# Patient Record
Sex: FEMALE | Race: WHITE
[De-identification: names, ages, dates, MRNs, and addresses within clinical notes are randomized per-mention and may not be internally consistent; named-entity substitution may affect disease eponyms.]

---

## 2018-02-15 ENCOUNTER — HOSPITAL ENCOUNTER (INPATIENT)
Dept: HOSPITAL 68 - SDA | Age: 83
LOS: 5 days | Discharge: NURSING, REHAB OR LONG-TERM CARE | DRG: 470 | End: 2018-02-20
Attending: ORTHOPAEDIC SURGERY | Admitting: ORTHOPAEDIC SURGERY
Payer: COMMERCIAL

## 2018-02-15 VITALS — SYSTOLIC BLOOD PRESSURE: 140 MMHG | DIASTOLIC BLOOD PRESSURE: 70 MMHG

## 2018-02-15 VITALS — HEIGHT: 66 IN | WEIGHT: 200 LBS | BODY MASS INDEX: 32.14 KG/M2

## 2018-02-15 VITALS — SYSTOLIC BLOOD PRESSURE: 140 MMHG | DIASTOLIC BLOOD PRESSURE: 82 MMHG

## 2018-02-15 VITALS — SYSTOLIC BLOOD PRESSURE: 130 MMHG | DIASTOLIC BLOOD PRESSURE: 70 MMHG

## 2018-02-15 VITALS — SYSTOLIC BLOOD PRESSURE: 150 MMHG | DIASTOLIC BLOOD PRESSURE: 80 MMHG

## 2018-02-15 VITALS — DIASTOLIC BLOOD PRESSURE: 80 MMHG | SYSTOLIC BLOOD PRESSURE: 144 MMHG

## 2018-02-15 DIAGNOSIS — Z88.5: ICD-10-CM

## 2018-02-15 DIAGNOSIS — F32.9: ICD-10-CM
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
## 2018-02-15 DIAGNOSIS — F41.9: ICD-10-CM

## 2018-02-15 DIAGNOSIS — F05: ICD-10-CM

## 2018-02-15 DIAGNOSIS — T40.2X5A: ICD-10-CM

## 2018-02-15 DIAGNOSIS — H26.9: ICD-10-CM

## 2018-02-15 DIAGNOSIS — K21.9: ICD-10-CM

## 2018-02-15 DIAGNOSIS — M17.12: Primary | ICD-10-CM

## 2018-02-15 DIAGNOSIS — Z86.73: ICD-10-CM

## 2018-02-15 DIAGNOSIS — E78.5: ICD-10-CM

## 2018-02-15 DIAGNOSIS — Z79.82: ICD-10-CM

## 2018-02-15 DIAGNOSIS — Z96.651: ICD-10-CM

## 2018-02-15 DIAGNOSIS — E03.9: ICD-10-CM

## 2018-02-15 DIAGNOSIS — I10: ICD-10-CM

## 2018-02-15 PROCEDURE — 84300 ASSAY OF URINE SODIUM: CPT

## 2018-02-15 PROCEDURE — 84133 ASSAY OF URINE POTASSIUM: CPT

## 2018-02-15 PROCEDURE — C1713 ANCHOR/SCREW BN/BN,TIS/BN: HCPCS

## 2018-02-15 PROCEDURE — 0SRD0J9 REPLACEMENT OF LEFT KNEE JOINT WITH SYNTHETIC SUBSTITUTE, CEMENTED, OPEN APPROACH: ICD-10-PCS | Performed by: ORTHOPAEDIC SURGERY

## 2018-02-15 PROCEDURE — 3E0T3BZ INTRODUCTION OF ANESTHETIC AGENT INTO PERIPHERAL NERVES AND PLEXI, PERCUTANEOUS APPROACH: ICD-10-PCS | Performed by: ORTHOPAEDIC SURGERY

## 2018-02-15 PROCEDURE — 2NASP: CPT

## 2018-02-15 NOTE — PATIENT DISCHARGE INSTRUCTIONS
Discharge Instructions
 
General Discharge Information
You were seen/treated for:
Left knee pain related to unilateral primary osteoarthritis
You had these procedures:
Left total knee replacement
Watch for these problems:
Increasing pain despite the use of pain medications.
Increasing redness, warmth, and swelling.
Drainage of any type from incision.  
Inability to bear weight on left leg.  
Persistent nausea or vomitting.
Inability to urinate or move bowels.  
Fever greater than 101.5
Call Surgeon to remove: Staples
Do not soak the wound: Yes
No bath, but you may shower: Yes
Other wound care:
Keep wound clean and dry.
 
No ointments or lotions on or near incision at any time. No exceptions.
 
 
 
 
Diet
Continue normal diet: Yes
Recommended Diet: Regular
 
Activity
Full Activity/No Limits: No
Activity Self Limited: Yes
Activity Limited to: Weight bear as tolerated
Other activity limits:
rolling walker assistance
 
Acute Coronary Syndrome
 
Inclusion Criteria
At DC or during hospital stay patient has or had the following:
ACS DIAGNOSIS No
 
Discharge Core Measures
Meds if any: Prescribed or Continued at Discharge
Meds if any: NOT Prescribed or Continued at Discharge
 
Congestive Heart Failure
 
Inclusion Criteria
At DC or during hospital stay patient has or had the following:
CHF DIAGNOSIS No
 
Discharge Core Measures
Meds if any: Prescribed or Continued at Discharge
Meds if any: NOT Prescribed or Continued at Discharge
 
Cerebrovascular accident
 
Inclusion Criteria
At DC or during hospital stay patient has or had the following:
CVA/TIA Diagnosis No
 
Discharge Core Measures
Meds if any: Prescribed or Continued at Discharge
Meds if any: NOT Prescribed or Continued at Discharge
 
Venous thromboembolism
 
Inclusion Criteria
VTE Diagnosis No
VTE Type NONE
VTE Confirmed by (Test) NONE
 
Discharge Core Measures
- Per Current guidelines, there needs to be overlap
- treatment for the first 5 days of Warfarin therapy.
- If discharged on Warfarin prior to 5 days of
- overlap therapy, the patient will need to be
- assessed for post discharge needs including
- *Post discharge parental anticoagulation
- *Warfarin and/or parental anticoagulation education
- *Follow up date to check INR post discharge
At least 5 days overlap therapy as Inpatient No
Meds if any: Prescribed or Continued at Discharge
Note: Overlap Therapy is Warfarin and Anticoagulant
Meds if any: NOT Prescribed or Continued at Discharge

## 2018-02-15 NOTE — PN- ORTHOPEDIC
Core Measures
 
Venous Thromboembolism
VTE Risk Factors Surgery
No Mechanical VTE Prophylaxis d/t N/A MechProphylax Ordered
No VTE Pharm Prophylaxis d/t NA PharmProphylax ordered

## 2018-02-15 NOTE — SURGICAL DISCHARGE SUMMARY
Visit Information
 
Visit Dates
Admission Date:
02/15/18
 
Discharge Date:
2/20/18
 
 
History of Present Illness
Chief Complaint:
Left knee pain related to unilateral primary osteoarthritis
 
Medical History
Neurological: TIA
EENT: cataracts
Cardiovascular: hypertension
Respiratory: NONE
Gastrointestinal: GERD
Hepatic: NONE
Renal: NONE
Musculoskeletal: NONE
Psychiatric: NONE
Endocrine: hypothyroidism
Blood Disorders: NONE
Cancer(s): NONE
GYN/Reproductive: miscarriage
History of MRSA: No
History of VRE: No
History of CDIFF: No
 
Surgical History
Pertinent Surgical History: D&C
 
Psychosocial History
What is Your Primary Language? English
Review of Systems:
See H&P
 
Hospital Course
 
Course
Attending Physician:
Deny Martinez MD
 
Primary Care Physician:
Dorothy HERNANDEZ,[facility] Course:
Mellisa was admitted to the hospital for an elective left total knee 
replacement.  She tolerated the procedure well and was transferred to a general 
surgical floor. Postoperatively she remained confused, until her narcotics were 
discontinued. She also had a witnessed fall while getting up to the bathoom with
a sitte. She reportedly lost her balance while throwing away something in the 
trash and slowly fell to the ground on her buttock. X-rays were obtained, which 
were negative for any fractures or dislocations. By the time of her hospital 
discharge, her diet was advanced and tolerated.  Her vital signs were stable and
within normal limits.  Her pain was controlled with oral pain medications.  She 
voided spontaneously. She remained neurovascularly intact.  She was evaluated 
and treated by physical therapy and was deemed appropriate for discharge to 
short term rehab. Remainder of hospital course was uneventful.
Complications:
None
Allergies:
Coded Allergies:
hydrocodone (unknown 02/02/18)
morphine (hallucinations 02/02/18)
nut - unspecified (UNKNOWN 09/21/16)
 
 
Disposition Summary
 
Disposition
Principal Diagnosis:
Left knee unilateral primary osteoarthritis
Additional Diagnosis:
same as above, s/p
 
left total knee replacement
Discharge Disposition: SNF
 
Discharge Instructions
 
General Discharge Information
Code Status: Full Code
Patient's Diet:
Regular, advance as tolerated
Patient's Activity:
WBAT, rolling walker assistance
Follow-Up Instructions/Appts:
6 week follow up with 
 
will need staples removed around post-op day#14
 
Medications at Discharge
Discharge Medications:
Stop taking the following medications:
Aspirin (Aspirin*) 81 MG TAB.CHEW ORAL DAILY 
 
Docusate Sodium (Docusate Sodium) 100 MG CAPSULE ORAL TWICE DAILY Qty = 30
 
Polyethylene Glycol 3350 (Miralax) 119 GM POWDER ORAL DAILY Qty = 10
 
Continue taking these medications:
Benazepril HCl (Benazepril HCl) 40 MG TABLET
    1 Tablet ORAL DAILY
    Comments:
       LISINOPRIL ADMINISTERED
       Last Taken: 2/20/18
             Time: 9AM
 
Potassium Chloride (K-Tab ER) 20 MEQ TABLET.ER
    1 Tablet ORAL DAILY
    Comments:
       Last Taken: 2/20/18
             Time: 9AM
 
Atenolol (Atenolol) 100 MG TABLET
    1 Tablet ORAL DAILY
    Comments:
       Last Taken: 2/20/18
             Time: 9AM
 
Omeprazole (Omeprazole) 20 MG CAPSULE.DR
    1 Capsule ORAL DAILY
    Comments:
       Last Taken: 2/20/18
             Time: 7AM
 
Amlodipine Besylate (Amlodipine Besylate) 10 MG TABLET
    1 Tablet ORAL DAILY
    Comments:
       Last Taken: 2/20/18
             Time: 9AM
 
Levothyroxine Sodium (Levothyroxine Sodium) 25 MCG TABLET
    1 Tablet ORAL DAILY BEFORE BREAKFAST
    Comments:
       Last Taken: 2/20/18
             Time: 7AM
 
Citalopram Hydrobromide (Citalopram HBr) 40 MG TABLET
    1 Tablet ORAL DAILY
    Comments:
       Last Taken: 2/20/18
             Time: 9AM
 
LORazepam (Ativan) 1 MG TABLET
    1 Tablet ORAL TWICE DAILY
    Comments:
       0.5MG ADMINISTERED
       Last Taken:2/20/18
             Time: 9AM
 
Lovastatin (Lovastatin) 10 MG TABLET
    1 Tablet ORAL DAILY
    Comments:
       Last Taken: 2/20/18
             Time: 5PM
 
Ascorbate Calcium (Vitamin C) 500 MG TABLET
    1 Tablet ORAL DAILY
    Comments:
       DID NOT ADMINISTER
 
Folic Acid (Folic Acid) 0.8 MG TABLET
    1 Tablet ORAL DAILY
    Comments:
       DID NOT ADMINISTER
 
Cholecalciferol (Vitamin D3) (Vitamin D3) 400 UNIT TABLET
    1 Tablet ORAL DAILY
    Comments:
       NOT GIVEN
 
Cyanocobalamin (Vitamin B-12) (Vitamin B-12) 250 MCG TABLET
    1 Tablet ORAL DAILY
    Comments:
       DID NOT ADMINISTER
 
Magnesium Oxide (Magnesium) 250 MG TABLET
    1 Tablet ORAL DAILY
    Comments:
       DID NOT ADMINISTER
 
Start taking the following new medications:
Apixaban (Eliquis) 2.5 MG TABLET
    2.5 Milligram ORAL TWICE DAILY
    Days = 42
    No Refills
    Instructions:
       x6 WEEKS POSTOP
    Comments:
       Last Taken: 2/20/18
             Time: 9AM
 
Celecoxib (Celebrex) 200 MG CAPSULE
    400 Milligram ORAL DAILY
    Days = 10
    No Refills
    Comments:
       Last Taken: 2/20/18
             Time: 9AM
 
Docusate Sodium (Docusate Sodium) 100 MG CAPSULE
    100 Milligram ORAL TWICE DAILY
    Days = 20
    No Refills
    Comments:
       Last Taken: 2/20/18
             Time: 9AM
 
Polyethylene Glycol 3350 (Miralax) 17 GRAM/DOSE POWDER
    17 Gram ORAL DAILY
    Days = 7
    No Refills
    Comments:
       DID NOT ADMINISTER
         
 
Acetaminophen (Tylenol Extra Strength) 500 MG TABLET
    1 Tablet ORAL EVERY 4-6 HOURS AS NEEDED as needed for pain control
    Days = 10
    No Refills
    Comments:
       Last Taken: 2/20/18
             Time: 2AM
 
Tramadol HCl (Ultram) 50 MG TABLET
    1 Tablet ORAL Every 6-8 Hours as Needed as needed for pain control
    Qty = 30
    No Refills
    Comments:
       Last Taken: 2/18/18
             Time: 4PM
 
 
Copies To:
Dorothy HERNANDEZ,Keysha

## 2018-02-15 NOTE — ADMISSION CORE MEASURES
Acute Coronary Syndrome (CM)
 
ACS Core Measures
Acute Coronary Syndrome Diagnosis No
 
Congestive Heart Failure (NEW)
 
CHF Core Measures
Congestive Heart Failure Diagnosis No
 
Cerebrovascular Accident (NEW)
 
CVA Core Measures
CVA/TIA Diagnosis No
 
Venous Thromboembolism [id number]
 
VTE Core Landen (View Protocol)
VTE Risk Factors Surgery
No Mechanical VTE Prophylaxis d/t N/A MechProphylax Ordered
No VTE Pharm Prophylaxis d/t NA PharmProphylax ordered
 
Problem List
 
As ranked by this Provider
includes Assessment & Plan
 1. Unilateral primary osteoarthritis, left knee
 
 
HOME MEDS
Home Med List
Amlodipine Besylate 10 MG TABLET   1 TAB PO DAILY HEART  (Reported)
Ascorbate Calcium (Vitamin C) 500 MG TABLET   1 TAB PO DAILY SUPPLEMENT  (
Reported)
Aspirin (Aspirin*) 81 MG TAB.CHEW   1 TAB PO DAILY HEART HEALTH  (Reported)
Atenolol 100 MG TABLET   1 TAB PO DAILY HEART  (Reported)
Benazepril HCl 40 MG TABLET   1 TAB PO DAILY HEART  (Reported)
Cholecalciferol (Vitamin D3) (Vitamin D3) 400 UNIT TABLET   1 TAB PO DAILY 
SUPPLEMENT  (Reported)
Citalopram Hydrobromide (Citalopram HBr) 40 MG TABLET   1 TAB PO DAILY MENTAL 
HEALTH  (Reported)
Cyanocobalamin (Vitamin B-12) (Vitamin B-12) 250 MCG TABLET   1 TAB PO DAILY 
SUPPLEMENT  (Reported)
Docusate Sodium 100 MG CAPSULE   100 MG PO BID constipation
Folic Acid 0.8 MG TABLET   1 TAB PO DAILY SUPPLEMENT  (Reported)
Levothyroxine Sodium 25 MCG TABLET   1 TAB PO DAILY AC THYROID  (Reported)
LORazepam (Ativan) 1 MG TABLET   1 TAB PO BID ANXIETY  (Reported)
Lovastatin 10 MG TABLET   1 TAB PO DAILY CHOLESTEROL  (Reported)
Magnesium Oxide (Magnesium) 250 MG TABLET   1 TAB PO DAILY SUPPLEMENT  (Reported
)
Omeprazole 20 MG CAPSULE.DR   1 CAP PO DAILY GI  (Reported)
Oxycodone HCl/Acetaminophen (Percocet 5-325 MG Tablet) 1 EACH TABLET   1-2 TAB 
PO Q4P PRN PAIN
Polyethylene Glycol 3350 (Miralax) 119 GM POWDER   17 GM PO DAILY CONSTIPATION
Potassium Chloride (K-Tab ER) 20 MEQ TABLET.ER   1 TAB PO DAILY SUPPLEMENT  (
Reported)

## 2018-02-15 NOTE — PN- ORTHOPEDIC
Subjective
Subjective:
POSTOP CHECK
 
Patient reports postop pain, which is well controlled. Tolerating clears without
any nausea/vomiting. She denies any numbness or tingling.  
 
Objective
Vital Signs and I&Os
Vital Signs
 
 
Date Time Temp Pulse Resp B/P B/P Pulse O2 O2 Flow FiO2
 
     Mean Ox Delivery Rate 
 
02/15 1330 97.5 68 18 130/70  95 Room Air  
 
 
 
Physical Exam:
Gen - Resting comfortably in PACU awake an alert in NAD
Cardiac - S1S2 noted, RRR
Lungs - CTAB
Ext - LLE dressing c/d/i, ice and onQ in place, jpx1 with serosanguineous 
drainage, moves all extremities, soft compartment, sensory and motor intact, alp
in place, no edema or calf tenderness B/L
 - wilcox in place 
Current Medications:
 Current Medications
 
 
  Sig/James Start time  Last
 
Medication Dose Route Stop Time Status Admin
 
Acetaminophen 0 .STK-MED ONE 02/15 0650 DC 
 
  PO   
 
Acetaminophen 650 MG ONCE 02/15 0000 NR 
 
  PO 02/15 2359  
 
Celecoxib 400 MG ONCE 02/15 0000 NR 
 
  PO 02/15 2359  
 
Dexamethasone 0 .STK-MED ONE 02/15 0652 DC 
 
  .ROUTE   
 
Dexamethasone 10 MG ONCE 02/15 0000 NR 
 
  IV 02/15 2359  
 
Gabapentin 0 .STK-MED ONE 02/15 0652 DC 
 
  PO   
 
Gabapentin 300 MG ONCE 02/15 0000 NR 
 
  PO 02/15 2359  
 
Oxycodone HCl 0 .STK-MED ONE 02/15 0652 DC 
 
  PO   
 
Oxycodone HCl 10 MG ONCE 02/15 0000 NR 
 
  PO 02/15 2359  
 
Ropivacaine 500 ML ONCE ONE 02/15 1045 AC 
 
ON-Q Ball 1 BAG INJ 02/17 0424  
 
Scopolamine HBr 0 .STK-MED ONE 02/15 0650 DC 
 
  TOP   
 
Scopolamine HBr 1 PAT ONCE 02/15 0000 NR 
 
  TOP 02/15 2359  
 
Vancomycin HCl 1,500 MG ONCE 02/15 0000 NR 
 
Sodium Chloride 250 ML IV 02/15 2359  
 
 
 
 
Assessment/Plan
Assessment/Plan
82 F POD 0 s/p L TKR, awaiting PT eval
 
Advance diet as tolerated
Cont IVF
PT eval, WBAT
SCAR to bulb suction
Pain meds prn
Cont OnQ pump
Postop abx - vanco
Home meds on board
[facility] tomorrow am
D/c wilcox in am
Encourage IS
 
 
Core Measures
 
Venous Thromboembolism
VTE Risk Factors Surgery
No Mechanical VTE Prophylaxis d/t N/A [facility]
No VTE Pharm Prophylaxis d/t NA PharmProphylax ordered

## 2018-02-15 NOTE — OPERATIVE REPORT
Operative/Inv Procedure Report
Surgery Date: 02/15/18
Name of Procedure:
Left total knee arthroplasty using Angela triathlon system
Pre-Operative Diagnosis:
Severe degenerative joint disease left knee
Post-Operative Diagnosis:
Severe degenerative joint disease left knee severe degenerative joint disease 
left knee
Estimated Blood Loss: 50ml to 100ml
Surgeon/Assistant:
Michelle HERNANDEZ,SATHISH Stoll
 
Anesthesia: block
Implants:
Striker triathlon system #3 femoral posterior stabilized component, #4 tibial 
baseplate, 19 mm polyethylene insert, 31 mm patellar component
Specimens:
Bone specimen sent to the pathology
Tourniquet:
Tourniquet time was 87 minutes
Complications:
None
Condition:
Stable returned to the recovery room
Operative Indication:
This is an 82-year-old lady who has had severe degenerative joint disease of 
both knees.  She had a successful right total knee arthroplasty performed in 
2016.  Despite medication and conservative treatment including injections her 
knee pain has not responded and she is now admitted for left total knee 
arthroplasty
 
Operative/Procedure Note
Note:
The patient received IV vancomycin in the preoperative area.  She was then taken
to the operating room and a spinal anesthetic was administered.
 
A second pair of skilled hands was required for the successful outcome of this 
procedure Mr. Tristan Gill, a certified physician's assistant was necessary for 
retraction, coagulation and positioning of the components.  His presence was 
required for the successful completion of this operation.
 
The patient reached she received a dose of transamic acid, the left lower 
extremity was then prepped and draped in usual sterile fashion.  A legholder was
used throughout the procedure and a tourniquet was inflated above the left thigh
to a pressure of 350 mmHg.  Prior to this the left lower extremity was 
exsanguinated with a sterile Esmarch.
 
A midline incision was made and taken down through subcutaneous cutaneous 
tissue.  Small bleeders are cauterized with the Bovie.  A medial parapatellar 
incision was made and the patella was easily dislocated laterally.  Osteophytes 
were removed from the femur tibia and patella.  A partial medial release was 
performed medial and lateral meniscectomies were performed.  The anterior 
cruciate ligament was excised.  A drill hole was then made just anterior to the 
attachment of the anterior cruciate ligament and the long femoral guide kimberly was 
inserted into the medullary canal.  The distal femoral cutting jig was assembled
and the distal femoral cut was made without difficulty.  The cut surfaces were 
then sized to a #3 femoral template.  Anterior posterior and chamfer cuts were 
then made.  A midline jig was used to osteotomize the midportion of the distal 
femur to accept the posterior stabilized component.
 
Attention was then turned to the tibial surface.  A drill hole was made at the 
footprint of the anterior cruciate ligament and the tibial guide kimberly was placed 
into the tibial medullary canal without difficulty.  3 mm of bone were resected 
from the medial side.  A number for tibial baseplate afforded excellent 
coverage.  A trial reduction was then performed using a 19 mm tibial insert 
patient had a full range of motion with full extension and good medial and 
lateral ligamentous balance.  Posterior osteophytes were then removed from the 
distal femur using a curved osteotome.
 
Attention was then turned to the patella.  A flat osteotomy was then performed 
and a 31 mm patellar trial afforded excellent coverage.  The patella tracked 
well without the need of a lateral release.  The cut surface of the tibial 
plateau was then drilled to accept the fins of the tibial baseplate.
 
The cut surfaces of bone were then copiously irrigated with pulsatile and 
antibiotic solution and dried.  A #3 femoral component was cemented into place. 
Following this a number for tibial baseplate was cemented into place.  Excess 
cement was removed with curettes.  A 19 mm polyethylene temporary trial was then
placed into the tibial baseplate and the knee was held in extension until the 
cement dried.  While this was performed a 31 mm patellar button was cemented 
into place and held with a clamp until the cement had dried.  After 15 minutes 
all cement surfaces were dried.  The tibial trial insert was removed and a 
permanent 19 mm polyethylene insert was placed over the baseplate and locked 
into position.  Patient had a full range of motion, full extension and good 
medial and lateral ligaments balance.  The patella tracked well.  The knee was 
once skin copiously irrigated.  A large Hemovac drain was placed in the lateral 
gutter through a small stab incision.  The deep tissue layers were closed with 0
Vicryl interrupted sutures.  The subcutaneous tissues closed with 2-0 Vicryl and
the skin was closed with surgical staples.  Blood loss was scant tourniquet time
was 87 minutes.  She is returned to the recovery room in excellent condition
 
 
Discharge Disposition: PACU

## 2018-02-16 VITALS — SYSTOLIC BLOOD PRESSURE: 136 MMHG | DIASTOLIC BLOOD PRESSURE: 80 MMHG

## 2018-02-16 VITALS — SYSTOLIC BLOOD PRESSURE: 124 MMHG | DIASTOLIC BLOOD PRESSURE: 58 MMHG

## 2018-02-16 VITALS — SYSTOLIC BLOOD PRESSURE: 122 MMHG | DIASTOLIC BLOOD PRESSURE: 60 MMHG

## 2018-02-16 VITALS — DIASTOLIC BLOOD PRESSURE: 80 MMHG | SYSTOLIC BLOOD PRESSURE: 120 MMHG

## 2018-02-16 VITALS — DIASTOLIC BLOOD PRESSURE: 60 MMHG | SYSTOLIC BLOOD PRESSURE: 132 MMHG

## 2018-02-16 VITALS — DIASTOLIC BLOOD PRESSURE: 76 MMHG | SYSTOLIC BLOOD PRESSURE: 160 MMHG

## 2018-02-16 VITALS — SYSTOLIC BLOOD PRESSURE: 148 MMHG | DIASTOLIC BLOOD PRESSURE: 64 MMHG

## 2018-02-16 LAB
ABSOLUTE GRANULOCYTE CT: 13.5 /CUMM (ref 1.4–6.5)
BASOPHILS # BLD: 0 /CUMM (ref 0–0.2)
BASOPHILS NFR BLD: 0.3 % (ref 0–2)
EOSINOPHIL # BLD: 0.1 /CUMM (ref 0–0.7)
EOSINOPHIL NFR BLD: 0.3 % (ref 0–5)
ERYTHROCYTE [DISTWIDTH] IN BLOOD BY AUTOMATED COUNT: 15.9 % (ref 11.5–14.5)
GRANULOCYTES NFR BLD: 76.4 % (ref 42.2–75.2)
HCT VFR BLD CALC: 31.3 % (ref 37–47)
LYMPHOCYTES # BLD: 2.7 /CUMM (ref 1.2–3.4)
MCH RBC QN AUTO: 30.6 PG (ref 27–31)
MCHC RBC AUTO-ENTMCNC: 33.4 G/DL (ref 33–37)
MCV RBC AUTO: 91.8 FL (ref 81–99)
MONOCYTES # BLD: 1.4 /CUMM (ref 0.1–0.6)
PLATELET # BLD: 262 /CUMM (ref 130–400)
PMV BLD AUTO: 8.6 FL (ref 7.4–10.4)
RED BLOOD CELL CT: 3.41 /CUMM (ref 4.2–5.4)
WBC # BLD AUTO: 17.7 /CUMM (ref 4.8–10.8)

## 2018-02-16 NOTE — PN- ORTHOPEDIC
Subjective
Subjective:
Patient reports postop pain which is well controlled. She denies ambulating with
PT yesterday. She denies any numbness, tingling, chest pain, shortness of breath
or trouble breathing. Overnight, she was lucid and was pulling out her IV, 
pulling at her drain and wilcox, requiring restraints per nursing.
 
Objective
Vital Signs and I&Os
Vital Signs
 
 
Date Time Temp Pulse Resp B/P B/P Pulse O2 O2 Flow FiO2
 
     Mean Ox Delivery Rate 
 
02/16 0820 98.1 74 20 124/58  97   
 
02/16 0750 98.1 84 18 160/76  93 Room Air  
 
02/16 0326 97.9 82 20 148/64  94 Room Air  
 
02/16 0030 97.7 70 18 136/80  95 Room Air  
 
02/15 2130 97.8 72 18 140/82  94 Room Air  
 
02/15 1930 97.7 74 18 144/80  94 Room Air  
 
02/15 1726 97.3 64 20 150/80  92 Room Air  
 
02/15 1534 97.0 70 18 140/70  92 Room Air  
 
02/15 1330 97.5 68 18 130/70  95 Room Air  
 
 
 Intake & Output
 
 
 02/16 1600 02/16 0800 02/16 0000 02/15 1600 02/15 0800 02/15 0000
 
Intake Total  920 1080 75  
 
Output Total  80 360 310  
 
Balance  840 720 -235  
 
       
 
Intake, IV  600 600 75  
 
Intake, Oral  320 480   
 
Output,  80 10 10  
 
Drainage      
 
Output, Urine   350 300  
 
Patient    200 lb  
 
Weight      
 
 
 
Physical Exam:
Gen - resting comfortably awake an alert x3 accompained by a sitter
Cardiac - S1S2 noted, RRR
Lungs - diminshed at bases otherwise clear
Ext - LLE dressing c/d/i, onQ in place, jpx1 with 100 cc serosanguineous 
drainage in last 24 hours, moves all extremities, soft compartment, sensory and 
motor intact, alp in place, no edema or calf tenderness B/L
 - wilcox in place 
Current Medications:
 Current Medications
 
 
  Sig/James Start time  Last
 
Medication Dose Route Stop Time Status Admin
 
Acetaminophen 650 MG ONCE 02/15 0000 DC 
 
  PO 02/15 2359  
 
Amlodipine Besylate 10 MG DAILY 02/15 1000 AC 
 
  PO   
 
Apixaban 2.5 MG BID 02/16 2200 AC 
 
  PO   
 
Atenolol 100 MG DAILY 02/16 1000 AC 
 
  PO   
 
Atorvastatin Calcium 10 MG 1700 02/16 1700 AC 
 
  PO   
 
Celecoxib 400 MG DAILY 02/16 1000 AC 
 
  PO   
 
Celecoxib 400 MG ONCE 02/15 0000 DC 
 
  PO 02/15 2359  
 
Citalopram  40 MG DAILY 02/16 1000 AC 
 
Hydrobromide  PO   
 
Dexamethasone 10 MG ONCE 02/15 0000 DC 
 
  IV 02/15 2359  
 
Dextrose/Lactated  1,000 ML Q13H 02/15 1400 DC 02/16
 
Ringer's  IV   0545
 
Docusate Sodium 100 MG DAILY AS NEEDED PRN 02/15 1400 AC 
 
  PO   
 
Gabapentin 300 MG ONCE 02/15 0000 DC 
 
  PO 02/15 2359  
 
Levothyroxine Sodium 0.025 MG DAILY AC 02/16 0700 AC 02/16
 
  PO   0545
 
Lisinopril 10 MG DAILY 02/16 1000 AC 
 
  PO   
 
Lorazepam 1 MG BID 02/15 1000 AC 02/15
 
  PO   2054
 
Meperidine HCl 50 MG Q3P PRN 02/15 1400 AC 
 
  IM   
 
Omeprazole 20 MG DAILY 02/15 1000 AC 02/15
 
  PO   1634
 
Ondansetron HCl 4 MG Q6P PRN 02/15 1400 AC 
 
  IV   
 
Oxycodone HCl 10 MG ONCE 02/15 0000 DC 
 
  PO 02/15 2359  
 
Oxycodone/ 1 TAB Q4P PRN 02/15 1400 AC 02/15
 
Acetaminophen  PO   1418
 
Oxycodone/ 2 TAB Q4P PRN 02/15 1400 AC 02/16
 
Acetaminophen  PO   0424
 
Polyethylene Glycol 17 GM DAILY AS NEEDED PRN 02/15 1400 AC 
 
  PO   
 
Potassium Chloride 10 MEQ DAILY 02/16 1000 AC 
 
  PO   
 
Ropivacaine 500 ML ONCE ONE 02/15 1045 AC 
 
ON-Q Ball 1 BAG INJ 02/17 0424  
 
Scopolamine HBr 1 PAT ONCE 02/15 0000 DC 
 
  TOP 02/15 2359  
 
Senna/Docusate Sodium 2 TAB AT BEDTIME AS NEED.. 02/15 1400 AC 
 
  PO   
 
Vancomycin HCl 1,500 MG ONCE ONE 02/15 1900 DC 02/15
 
Dextrose/Water 250 ML IV 02/15 2029  1846
 
Vancomycin HCl 1,500 MG ONCE 02/15 0000 DC 
 
Sodium Chloride 250 ML IV 02/15 2359  
 
 
 
 
Results
Last 48 Hours of Labs:
 Laboratory Tests
 
 
 02/16 02/16
 
 0605 0600
 
Chemistry  
 
  Sodium (137 - 145 mmol/L) 133  L Cancelled
 
  Potassium (3.5 - 5.1 mmol/L) 4.1 Cancelled
 
  Chloride (98 - 107 mmol/L) 95  L Cancelled
 
  Carbon Dioxide (22 - 30 mmol/L) 27 Cancelled
 
  Anion Gap (5 - 16) 11 Cancelled
 
  BUN (7 - 17 mg/dL) 15 Cancelled
 
  Creatinine (0.5 - 1.0 mg/dL) 0.8 Cancelled
 
  Estimated GFR (>60 ml/min) > 60 
 
  BUN/Creatinine Ratio (7 - 25 %) 18.8 Cancelled
 
  Phosphorus (2.5 - 4.5 mg/dL) 3.5 
 
  Magnesium (1.6 - 2.3 mg/dL) 1.8 
 
  Troponin I (< 0.11 ng/ml) < 0.01 
 
Hematology  
 
  CBC w Diff NO MAN DIFF REQ Cancelled
 
  WBC (4.8 - 10.8 /CUMM) 17.7  H Cancelled
 
  RBC (4.20 - 5.40 /CUMM) 3.41  L Cancelled
 
  Hgb (12.0 - 16.0 G/DL) 10.4  L Cancelled
 
  Hct (37 - 47 %) 31.3  L Cancelled
 
  MCV (81.0 - 99.0 FL) 91.8 Cancelled
 
  MCH (27.0 - 31.0 PG) 30.6 Cancelled
 
  MCHC (33.0 - 37.0 G/DL) 33.4 Cancelled
 
  RDW (11.5 - 14.5 %) 15.9  H Cancelled
 
  Plt Count (130 - 400 /CUMM) 262 Cancelled
 
  MPV (7.4 - 10.4 FL) 8.6 Cancelled
 
  Gran % (42.2 - 75.2 %) 76.4  H 
 
  Lymphocytes % (20.5 - 51.1 %) 15.0  L 
 
  Monocytes % (1.7 - 9.3 %) 8.0 
 
  Eosinophils % (0 - 5 %) 0.3 
 
  Basophils % (0.0 - 2.0 %) 0.3 
 
  Absolute Granulocytes (1.4 - 6.5 /CUMM) 13.5  H 
 
  Absolute Lymphocytes (1.2 - 3.4 /CUMM) 2.7 
 
  Absolute Monocytes (0.10 - 0.60 /CUMM) 1.4  H 
 
  Absolute Eosinophils (0.0 - 0.7 /CUMM) 0.1 
 
  Absolute Basophils (0.0 - 0.2 /CUMM) 0 
 
 
 
 
Assessment/Plan
Assessment/Plan
82 F POD 1 s/p L TKR with confusion overnight, requiring restraints
 
PT eval, WBAT
Reg diet
D/c IVF
SCAR to bulb suction - possbile d/c later today
Pain meds prn
Cont OnQ pump
Home meds on board
Eliquis bid
D/c brenden 
D/c restraints
Order sitter for confusion, high fall risk
Encourage IS
F/u labs
Will d/w Dr. Martinez
 
 
Core Measures
 
Venous Thromboembolism
VTE Risk Factors Surgery
No Mechanical VTE Prophylaxis d/t N/A MechProphylax Ordered
No VTE Pharm Prophylaxis d/t NA PharmProphylax ordered

## 2018-02-16 NOTE — PN- ORTHOPEDIC
Surgical Brief Attending Note
Brief Attending Note:
Mrs. Grady is 24 hours status post left total knee arthroplasty.  He was 
comfortable and her vital signs are stable however she is confused.  Apparently 
pulled out her IV and [place] last evening.  She needed restraint but now she has 
a monitor at the bedside.  She has not had physical therapy to date.  Her 
dressing is dry and her neurovascular status is intact.
 
Her potassium today is 4.1 her hemoglobin is 10.4 and hematocrit is 31.3.  She 
has an elevated white count to 17,000 which is not abnormal the day following 
surgery.  Her CBC should be repeated tomorrow.
 
We'll start Eliquis 2.5 mg twice a day starting this evening this will continue 
for 4 weeks.  She should be able to be transferred to  [facility] for short-term 
rehabilitation Sunday.  I will see her back in the office in 4 weeks barring any
problems

## 2018-02-17 VITALS — DIASTOLIC BLOOD PRESSURE: 64 MMHG | SYSTOLIC BLOOD PRESSURE: 142 MMHG

## 2018-02-17 VITALS — SYSTOLIC BLOOD PRESSURE: 126 MMHG | DIASTOLIC BLOOD PRESSURE: 60 MMHG

## 2018-02-17 VITALS — SYSTOLIC BLOOD PRESSURE: 124 MMHG | DIASTOLIC BLOOD PRESSURE: 80 MMHG

## 2018-02-17 NOTE — PN- ORTHOPEDIC
Subjective
Subjective:
feeling ok, does not feel confused.  no pain, no oob yet due to confusion 
yesterday.  awaiting pt eval today.  rabia reg diet, denies n/v.  no cp/sob
 
Objective
Vital Signs and I&Os
Vital Signs
 
 
Date Time Temp Pulse Resp B/P B/P Pulse O2 O2 Flow FiO2
 
     Mean Ox Delivery Rate 
 
02/17 0628 97.9 76 20 126/60  95 Room Air  
 
02/16 2226 98.6 73 20 122/60  94   
 
02/16 1422 98.6 68 20 120/80  90 Room Air  
 
02/16 1406       Room Air  
 
02/16 1124  74  132/60     
 
02/16 1041  80  132/60     
 
02/16 1041  82  132/60     
 
02/16 1001 98.6 82 14 132/60  96   
 
 
 Intake & Output
 
 
 02/17 1600 02/17 0800 02/17 0000 02/16 1600 02/16 0800 02/16 0000
 
Intake Total  200 480 
 
Output Total  550 250 300 80 360
 
Balance  -350 230 575 840 720
 
       
 
Intake, IV    75 600 600
 
Intake, Oral  200 480 800 320 480
 
Number      
 
Bowel      
 
Movements      
 
Output,     80 10
 
Drainage      
 
Output, Urine  550 250 300  350
 
 
 
Physical Exam:
gen- nad.  alert, awake, orientated to person, place, time
card- s1s2 rrr
pulm- ctab
abd- soft nt
ext- LLE dressing dc'ed, incision cdi. redressed.  +pedal pulses, gross sensate 
intact, +dorsi/plantar flexion.  calves soft nt bl.  
Current Medications:
 Current Medications
 
 
  Sig/James Start time  Last
 
Medication Dose Route Stop Time Status Admin
 
Acetaminophen 1,000 MG Q6P PRN 02/17 0700 AC 
 
N/A 1 UNIT IV   
 
Amlodipine Besylate 10 MG DAILY 02/15 1000 AC 02/16
 
  PO   1041
 
Apixaban 2.5 MG BID 02/16 2200 AC 02/16
 
  PO   2136
 
Atenolol 100 MG DAILY 02/16 1000 AC 02/16
 
  PO   1041
 
Atorvastatin Calcium 10 MG 1700 02/16 1700 AC 02/16
 
  PO   1705
 
Bisacodyl 10 MG ONCE ONE 02/17 0700 DC 
 
  MI 02/17 0701  
 
Celecoxib 400 MG DAILY 02/16 1000 AC 02/16
 
  PO   1041
 
Citalopram  40 MG DAILY 02/16 1000 AC 02/16
 
Hydrobromide  PO   1040
 
Docusate Sodium 100 MG BID 02/16 2200 AC 02/16
 
  PO   2136
 
Docusate Sodium 100 MG DAILY AS NEEDED PRN 02/15 1400 DC 02/16
 
  PO   1041
 
Levothyroxine Sodium 0.025 MG DAILY AC 02/16 0700 AC 02/17
 
  PO   0601
 
Lisinopril 10 MG DAILY 02/16 1000 AC 02/16
 
  PO   1124
 
Lorazepam 0.5 MG Q8P PRN 02/17 0700 AC 
 
  PO   
 
Lorazepam 1 MG BID 02/15 1000 DC 02/16
 
  PO   2136
 
Meperidine HCl 50 MG Q3P PRN 02/15 1400 DC 
 
  IM   
 
Omeprazole 20 MG DAILY 02/15 1000 AC 02/16
 
  PO   1041
 
Ondansetron HCl 4 MG Q6P PRN 02/15 1400 AC 
 
  IV   
 
Oxycodone HCl 5 MG Q4 HRS AS NEEDED PRN 02/17 0700 AC 
 
  PO   
 
Oxycodone/ 1 TAB Q4P PRN 02/15 1400 DC 02/15
 
Acetaminophen  PO   1418
 
Oxycodone/ 2 TAB Q4P PRN 02/15 1400 DC 02/17
 
Acetaminophen  PO   0230
 
Patient Medication  1 ED ONE ONE 02/16 1545 DC 02/16
 
Teaching  ED 02/16 1546  1707
 
Polyethylene Glycol 17 GM DAILY PRN 02/17 1000 AC 
 
  PO   
 
Polyethylene Glycol 17 GM DAILY AS NEEDED PRN 02/15 1400 DC 02/16
 
  PO   1039
 
Potassium Chloride 10 MEQ DAILY 02/16 1000 AC 02/16
 
  PO   1040
 
Ropivacaine 500 ML ONCE ONE 02/15 1045 DC 
 
ON-Q Ball 1 BAG INJ 02/17 0424  
 
Senna/Docusate Sodium 2 TAB AT BEDTIME AS NEED.. 02/15 1400 AC 
 
  PO   
 
 
 
 
Results
Last 48 Hours of Labs:
 Laboratory Tests
 
 
 02/16 02/16
 
 0605 0600
 
Chemistry  
 
  Sodium (137 - 145 mmol/L) 133  L Cancelled
 
  Potassium (3.5 - 5.1 mmol/L) 4.1 Cancelled
 
  Chloride (98 - 107 mmol/L) 95  L Cancelled
 
  Carbon Dioxide (22 - 30 mmol/L) 27 Cancelled
 
  Anion Gap (5 - 16) 11 Cancelled
 
  BUN (7 - 17 mg/dL) 15 Cancelled
 
  Creatinine (0.5 - 1.0 mg/dL) 0.8 Cancelled
 
  Estimated GFR (>60 ml/min) > 60 
 
  BUN/Creatinine Ratio (7 - 25 %) 18.8 Cancelled
 
  Phosphorus (2.5 - 4.5 mg/dL) 3.5 
 
  Magnesium (1.6 - 2.3 mg/dL) 1.8 
 
  Troponin I (< 0.11 ng/ml) < 0.01 
 
Hematology  
 
  CBC w Diff NO MAN DIFF REQ Cancelled
 
  WBC (4.8 - 10.8 /CUMM) 17.7  H Cancelled
 
  RBC (4.20 - 5.40 /CUMM) 3.41  L Cancelled
 
  Hgb (12.0 - 16.0 G/DL) 10.4  L Cancelled
 
  Hct (37 - 47 %) 31.3  L Cancelled
 
  MCV (81.0 - 99.0 FL) 91.8 Cancelled
 
  MCH (27.0 - 31.0 PG) 30.6 Cancelled
 
  MCHC (33.0 - 37.0 G/DL) 33.4 Cancelled
 
  RDW (11.5 - 14.5 %) 15.9  H Cancelled
 
  Plt Count (130 - 400 /CUMM) 262 Cancelled
 
  MPV (7.4 - 10.4 FL) 8.6 Cancelled
 
  Gran % (42.2 - 75.2 %) 76.4  H 
 
  Lymphocytes % (20.5 - 51.1 %) 15.0  L 
 
  Monocytes % (1.7 - 9.3 %) 8.0 
 
  Eosinophils % (0 - 5 %) 0.3 
 
  Basophils % (0.0 - 2.0 %) 0.3 
 
  Absolute Granulocytes (1.4 - 6.5 /CUMM) 13.5  H 
 
  Absolute Lymphocytes (1.2 - 3.4 /CUMM) 2.7 
 
  Absolute Monocytes (0.10 - 0.60 /CUMM) 1.4  H 
 
  Absolute Eosinophils (0.0 - 0.7 /CUMM) 0.1 
 
  Absolute Basophils (0.0 - 0.2 /CUMM) 0 
 
 
 
 
Assessment/Plan
Assessment/Plan
A- POD2 sp L TKR, with confusion yesterday and overnight requiring safety 
monitor at bedside.  Alert and orientated now.  Awating PT eval today.  Pain 
controlled.
 
P-
limit narcotics.  IV tylenol and celebrex first line for pain.
dc sitter.  continue to monitor closely
eliquis, alps
ist
oob, wbat, pt
dc planning- str ?tomorrow
will dw attending
 
 
Core Measures
 
Venous Thromboembolism
VTE Risk Factors Surgery
No Mechanical VTE Prophylaxis d/t N/A MechProphylax Ordered
No VTE Pharm Prophylaxis d/t NA PharmProphylax ordered

## 2018-02-17 NOTE — RADIOLOGY REPORT
EXAMINATION:
XR KNEE, LEFT
 
CLINICAL INFORMATION:
Status post total knee replacement.
 
COMPARISON:
None
 
TECHNIQUE:
Two views of the left knee.
 
FINDINGS:
There is generalized soft tissue swelling and subcutaneous gas with a joint
effusion. Skin staples are in place. The tibial stem and femoral components
of the prosthesis are normally seated without periprosthetic lucency. No
fracture or dislocation is seen. Alignment is anatomic.
 
IMPRESSION:
Expected postsurgical findings, status post total knee replacement. Normal
alignment.

## 2018-02-18 VITALS — DIASTOLIC BLOOD PRESSURE: 60 MMHG | SYSTOLIC BLOOD PRESSURE: 124 MMHG

## 2018-02-18 VITALS — DIASTOLIC BLOOD PRESSURE: 78 MMHG | SYSTOLIC BLOOD PRESSURE: 140 MMHG

## 2018-02-18 VITALS — SYSTOLIC BLOOD PRESSURE: 150 MMHG | DIASTOLIC BLOOD PRESSURE: 80 MMHG

## 2018-02-18 VITALS — SYSTOLIC BLOOD PRESSURE: 134 MMHG | DIASTOLIC BLOOD PRESSURE: 60 MMHG

## 2018-02-18 LAB
ABSOLUTE GRANULOCYTE CT: 9.1 /CUMM (ref 1.4–6.5)
BASOPHILS # BLD: 0 /CUMM (ref 0–0.2)
BASOPHILS NFR BLD: 0.2 % (ref 0–2)
EOSINOPHIL # BLD: 0.1 /CUMM (ref 0–0.7)
EOSINOPHIL NFR BLD: 1.2 % (ref 0–5)
ERYTHROCYTE [DISTWIDTH] IN BLOOD BY AUTOMATED COUNT: 15.6 % (ref 11.5–14.5)
GRANULOCYTES NFR BLD: 73.3 % (ref 42.2–75.2)
HCT VFR BLD CALC: 28 % (ref 37–47)
LYMPHOCYTES # BLD: 2.2 /CUMM (ref 1.2–3.4)
MCH RBC QN AUTO: 30.7 PG (ref 27–31)
MCHC RBC AUTO-ENTMCNC: 33.8 G/DL (ref 33–37)
MCV RBC AUTO: 90.8 FL (ref 81–99)
MONOCYTES # BLD: 0.9 /CUMM (ref 0.1–0.6)
PLATELET # BLD: 252 /CUMM (ref 130–400)
PMV BLD AUTO: 8.6 FL (ref 7.4–10.4)
RED BLOOD CELL CT: 3.09 /CUMM (ref 4.2–5.4)
WBC # BLD AUTO: 12.4 /CUMM (ref 4.8–10.8)

## 2018-02-18 NOTE — PN- ORTHOPEDIC
**See Addendum**
Subjective
Subjective:
Improving confusion. Sitter discontinued yesterday morning. She is getting 
oxycodone for pain control. Her usual dose of twice daily ativan was held 
yesterday due to her confusion / delirium. She is tolerating food. Decreased 
appetite but denies nausea/vomiting. No dizziness. No shortness of breath. No 
chest pains. Reported bm yesterday. Voiding without difficulty. Her PCP is 
.
 
Objective
Vital Signs and I&Os
Vital Signs
 
 
Date Time Temp Pulse Resp B/P B/P Pulse O2 O2 Flow FiO2
 
     Mean Ox Delivery Rate 
 
02/18 0650 97.8 80 20 140/78  94 Room Air  
 
02/17 2138 98.7 77 20 142/64  92 Room Air  
 
02/17 1343 98.0 76 20 124/80  92 Room Air  
 
02/17 1034 97.9 76 20 126/60     
 
02/17 1034 97.9 76 20 126/60     
 
02/17 1034 97.9 76 20 126/60     
 
 
 Intake & Output
 
 
 02/18 1600 02/18 0800 02/18 0000 02/17 1600 02/17 0800 02/17 0000
 
Intake Total  200 250 800 200 480
 
Output Total  3025  700 550 250
 
Balance  -2825 250 100 -350 230
 
       
 
Intake, Oral  200 250 800 200 480
 
Number    1  
 
Bowel      
 
Movements      
 
Output, Urine  3025  700 550 250
 
 
 
Physical Exam:
General - alert. "It's friday, February 2018". She recalls having surgery on 
thursday by . 
Lungs - clear bilaterally. no w/r/r.
Cardiac - s1s2. reg.
Abdomen - soft. nontender.
Extremities - warm bilaterally. dressing changed. incision approximated with 
staples. no erythema or exudates. calves soft and nontender b/l. nvi. athrombic 
pumps in place.
Current Medications:
 Current Medications
 
 
  Sig/James Start time  Last
 
Medication Dose Route Stop Time Status Admin
 
Acetaminophen 1,000 MG Q6P PRN 02/17 0700 AC 
 
N/A 1 UNIT IV   
 
Amlodipine Besylate 10 MG DAILY 02/15 1000 AC 02/17
 
  PO   1034
 
Apixaban 2.5 MG BID 02/16 2200 AC 02/17
 
  PO   2011
 
Atenolol 100 MG DAILY 02/16 1000 AC 02/17
 
  PO   1034
 
Atorvastatin Calcium 10 MG 1700 02/16 1700 AC 02/17
 
  PO   1642
 
Celecoxib 400 MG DAILY 02/16 1000 AC 02/17
 
  PO   1033
 
Citalopram  40 MG DAILY 02/16 1000 AC 02/17
 
Hydrobromide  PO   1033
 
Docusate Sodium 100 MG .STK-MED ONE 02/17 2010 DC 
 
  PO 02/17 2011  
 
Docusate Sodium 100 MG BID 02/16 2200 AC 02/17
 
  PO   2011
 
Levothyroxine Sodium 0.025 MG DAILY AC 02/16 0700 AC 02/18
 
  PO   0610
 
Lisinopril 10 MG DAILY 02/16 1000 AC 02/17
 
  PO   1034
 
Lorazepam 0.5 MG BID 02/18 1000 UNVr 
 
  PO 02/25 0959  
 
Lorazepam 0.5 MG ONE ONE 02/17 1900 DC 02/17
 
  PO 02/17 1901  1854
 
Lorazepam 0.5 MG Q8P PRN 02/17 0700 DC 
 
  PO   
 
Omeprazole 20 MG DAILY 02/15 1000 AC 02/17
 
  PO   1034
 
Ondansetron HCl 4 MG Q6P PRN 02/15 1400 AC 
 
  IV   
 
Oxycodone HCl 5 MG Q4 HRS AS NEEDED PRN 02/17 0700 AC 02/18
 
  PO   0500
 
Polyethylene Glycol 17 GM DAILY PRN 02/17 1000 AC 
 
  PO   
 
Potassium Chloride 10 MEQ DAILY 02/16 1000 AC 02/17
 
  PO   1034
 
Senna/Docusate Sodium 2 TAB AT BEDTIME AS NEED.. 02/15 1400 AC 
 
  PO   
 
 
 
 
Assessment/Plan
Assessment/Plan
This 82 year old female with hx htn, hld, hypothyroidism, depression/anxiety, 
gerd, is POD#3 s/p
L TKR, with post-op delirium that is improving daily
 
tolerating regular diet
ativan held, but will try to restart at half her baseline dose (0.5 mg instead 
of 1 mg bid)
continue pain medication as needed. limiting narcotics as able
continue PT
dressing changed
spoke with her daughter Virginia on the phone regarding her post-op course and 
improving delirium, who reports this has happened in her past after another 
surgery
will reach out to her PCP, Dr.Sudipta De La Cruz, to discuss and for consult
likely d/c to STR today
will d/w 
 
 
 
 
Core Measures
 
Venous Thromboembolism
VTE Risk Factors Surgery
No Mechanical VTE Prophylaxis d/t N/A MechProphylax Ordered
No VTE Pharm Prophylaxis d/t NA PharmProphylax ordered

## 2018-02-18 NOTE — RADIOLOGY REPORT
EXAMINATION:
XR HIP, LEFT
XR KNEE, LEFT
 
CLINICAL INFORMATION:
Postoperative from left total knee replacement and fall. Evaluate for
fracture.
 
COMPARISON:
Radiographs of the knee from 02/17/2018
 
TECHNIQUE:
Left hip, 2 views
Left knee, 4 views
 
FINDINGS:
Left hip: The visualized pelvic bones are intact. The femoral head is
well-positioned within the intact acetabulum. The femoroacetabular joint
space is normal. No evidence of femoral fracture or subluxation. Incidentally
noted is degenerative subarticular sclerosis along the iliac margin of the
left sacroiliac joint, inferiorly, and vacuum disc degenerative change at
L5-S1.
 
Left knee: The components of the total knee arthroplasty demonstrate
satisfactory positioning and alignment. No acute periprosthetic fracture or
hardware loosening. Anterior skin staples in place. Postoperative soft tissue
edema around the knee.
 
IMPRESSION:
 
1. No acute findings at the left hip.
2. No acute fracture or malalignment at the left knee.

## 2018-02-18 NOTE — EVENT NOTE
Event Note
Event Note:
rapid response called due to patient fall. she got up to the bathroom with the 
sitter, and reportedly loss her balance while throwing away something in trash. 
she slowly fell to the ground on her bottom, witnessed by the sitter who was 
with her. she was assessed by the rapid response team, and assissted back to 
bed. she did not hit her head. she reports some left buttock pain.
 
vitals stable, bp 150/80, hr 77, 93% on room air, temp 98.5, blood sugar 108
 
general - alert & oriented x 3. comfortable in bed. no acute distress.
head - ncat
lungs - clear bilaterally. no w/r/r.
cardiac - s1s2. reg.
abdomen - soft. nontender
extremities - left knee dressing c/d/i. nvi. no deformities, abrasions, swelling
, or lacerations appreciated. nvi.
neuro - full exam witnessed by medical resident, without any acute focal 
deficit.
 
the patient has likely ongoing post-operative delirium, which is slowly 
improving every day, now POD#3 s/p left totla knee replacement
 
several calls made to her PCP 's office today, with no call backs
her ativan is currently at 0.5 mg BID (half her usual twice daily dose)
oxycodone has been discontinued. will try ultram / tylenol as needed
will check xrays of her left hip & left knee to r/o fx / dislocation
contacted her daughter Virginia regarding this event
continue sitter at this time for safety
will call hospitalist for co-management, given her confusion seems to be her 
primary problem at this time
 aware &  notified

## 2018-02-19 VITALS — DIASTOLIC BLOOD PRESSURE: 72 MMHG | SYSTOLIC BLOOD PRESSURE: 122 MMHG

## 2018-02-19 VITALS — SYSTOLIC BLOOD PRESSURE: 142 MMHG | DIASTOLIC BLOOD PRESSURE: 76 MMHG

## 2018-02-19 VITALS — DIASTOLIC BLOOD PRESSURE: 70 MMHG | SYSTOLIC BLOOD PRESSURE: 132 MMHG

## 2018-02-19 LAB
ABSOLUTE GRANULOCYTE CT: 8.7 /CUMM (ref 1.4–6.5)
BASOPHILS # BLD: 0 /CUMM (ref 0–0.2)
BASOPHILS NFR BLD: 0.2 % (ref 0–2)
EOSINOPHIL # BLD: 0.3 /CUMM (ref 0–0.7)
EOSINOPHIL NFR BLD: 2.5 % (ref 0–5)
ERYTHROCYTE [DISTWIDTH] IN BLOOD BY AUTOMATED COUNT: 15.2 % (ref 11.5–14.5)
GRANULOCYTES NFR BLD: 72 % (ref 42.2–75.2)
HCT VFR BLD CALC: 27.3 % (ref 37–47)
LYMPHOCYTES # BLD: 2 /CUMM (ref 1.2–3.4)
MCH RBC QN AUTO: 31 PG (ref 27–31)
MCHC RBC AUTO-ENTMCNC: 34.3 G/DL (ref 33–37)
MCV RBC AUTO: 90.6 FL (ref 81–99)
MONOCYTES # BLD: 1 /CUMM (ref 0.1–0.6)
PLATELET # BLD: 291 /CUMM (ref 130–400)
PMV BLD AUTO: 8.6 FL (ref 7.4–10.4)
RED BLOOD CELL CT: 3.02 /CUMM (ref 4.2–5.4)
WBC # BLD AUTO: 12.1 /CUMM (ref 4.8–10.8)

## 2018-02-19 NOTE — CONS- MEDICAL
Jadon Ellis 02/19/18 0930:
General Information and HPI
 
Consulting Request
Date of Consult: 02/19/18
Requested By:
Michelle HERNANDEZ,Deny VELIZ
 
Reason for Consult:
post op confusion
Source of Information: patient, old records
Exam Limitations: no limitations
History of Present Illness:
This is a 82-year-old female with past medical history significant for cataracts
, TIA on statin, hypertension, hypothyroidism, anxiety, depression, GERD, 
bilateral knee osteoarthritis, status post total knee replacement right knee was
admitted to surgical service for elective left total knee replacement on 05/15/
2018.
 
Medical team was consulted on 02/19/2018 for management of postoperative 
confusion.
 
 
Patient underwent successful left total knee replacement on 02/15/2018 by Dr. Martinez.  It was an elective procedure.  Patient tolerated the procedure.  She 
was maintained on normal diet.  Vitals were within normal limits.  On 16/2018 
patient was found to have postoperative confusion, couldn't sleep, she was lucid
pulling out IV line, Downey's, SCAR drain.  She was placed on restraints.  Also 
continue safety monitoring was ordered.  Her home medication Ativan 1 mg twice 
daily was held.  On 18/2018 RRT was called after patient lost her balance and 
fell in the restroom. Hip xry and 
Knee x-ray was normal.  Off note she was on oxycodone and Ativan prior to the 
fall.  She was continued on safety monitor.  Restraints were discontinued. 
Postoperative course was complicated by fall.  She was able to tolerate diet.  
Received adequate pain management.  Foleys catheter was removed.  Physical 
therapist on board, recommended short-term rehabilitation.
 
 
Patient was initially given oxycodone for pain management.  She also received 
Ativan for a few days.  However given her postoperative confusion oxycodone and 
Ativan were discontinued.
 
 
Review of systems negative for chest pain, short of breath, fever, chills, cough
, nausea, vomiting, abdominal pain, diarrhea, frequency, urgency, dysuria.  She 
reports on and off constipation.  Denies any urinary symptoms.  Off note she 
reports allergy to oxycodone in the past.  She received oxycodone in the past, 
reports oversedation.  She underwent right total knee replacement and she was 
found to have postoperative delirium for a few days in the past.  She never 
smoked, occasional alcohol abuse, denies illicit drug abuse.
 
 
 
Allergies/Medications
Allergies:
Coded Allergies:
hydrocodone (unknown 02/02/18)
morphine (hallucinations 02/02/18)
nut - unspecified (UNKNOWN 09/21/16)
 
Home Med List:
Acetaminophen (Tylenol Extra Strength) 500 MG TABLET   1 TAB PO Q4-6 PRN PRN 
pain control
Amlodipine Besylate 10 MG TABLET   1 TAB PO DAILY HEART  (Reported)
Apixaban (Eliquis) 2.5 MG TABLET   2.5 MG PO BID POSTOP ANTICOAGULATION
     x6 WEEKS POSTOP
Ascorbate Calcium (Vitamin C) 500 MG TABLET   1 TAB PO DAILY SUPPLEMENT  (
Reported)
Aspirin (Aspirin*) 81 MG TAB.CHEW   1 TAB PO DAILY HEART HEALTH  (Reported)
Atenolol 100 MG TABLET   1 TAB PO DAILY HEART  (Reported)
Benazepril HCl 40 MG TABLET   1 TAB PO DAILY HEART  (Reported)
Celecoxib (Celebrex) 200 MG CAPSULE   400 MG PO DAILY POSTOP
Cholecalciferol (Vitamin D3) (Vitamin D3) 400 UNIT TABLET   1 TAB PO DAILY 
SUPPLEMENT  (Reported)
Citalopram Hydrobromide (Citalopram HBr) 40 MG TABLET   1 TAB PO DAILY MENTAL 
HEALTH  (Reported)
Cyanocobalamin (Vitamin B-12) (Vitamin B-12) 250 MCG TABLET   1 TAB PO DAILY 
SUPPLEMENT  (Reported)
Docusate Sodium 100 MG CAPSULE   100 MG PO BID constipation
Docusate Sodium 100 MG CAPSULE   100 MG PO BID constipation
Folic Acid 0.8 MG TABLET   1 TAB PO DAILY SUPPLEMENT  (Reported)
Levothyroxine Sodium 25 MCG TABLET   1 TAB PO DAILY AC THYROID  (Reported)
LORazepam (Ativan) 1 MG TABLET   1 TAB PO BID ANXIETY  (Reported)
Lovastatin 10 MG TABLET   1 TAB PO DAILY CHOLESTEROL  (Reported)
Magnesium Oxide (Magnesium) 250 MG TABLET   1 TAB PO DAILY SUPPLEMENT  (Reported
)
Omeprazole 20 MG CAPSULE.DR   1 CAP PO DAILY GI  (Reported)
Polyethylene Glycol 3350 (Miralax) 17 GRAM/DOSE POWDER   17 GM PO DAILY 
CONSTIPATION
Polyethylene Glycol 3350 (Miralax) 119 GM POWDER   17 GM PO DAILY CONSTIPATION
Potassium Chloride (K-Tab ER) 20 MEQ TABLET.ER   1 TAB PO DAILY SUPPLEMENT  (
Reported)
Tramadol HCl (Ultram) 50 MG TABLET   1 TAB PO Q6-8P PRN pain control
 
Current Medications:
 Current Medications
 
 
  Sig/James Start time  Last
 
Medication Dose Route Stop Time Status Admin
 
Acetaminophen 500 MG Q4-6 PRN PRN 02/18 1115 AC 02/19
 
  PO   0825
 
Acetaminophen 1,000 MG Q6P PRN 02/17 0700 DC 
 
N/A 1 UNIT IV   
 
Amlodipine Besylate 10 MG DAILY 02/15 1000 AC 02/19
 
  PO   0756
 
Apixaban 2.5 MG BID 02/16 2200 AC 02/19
 
  PO   0757
 
Atenolol 100 MG DAILY 02/16 1000 AC 02/19
 
  PO   0758
 
Atorvastatin Calcium 10 MG 1700 02/16 1700 AC 02/18
 
  PO   1642
 
Celecoxib 400 MG DAILY 02/16 1000 AC 02/19
 
  PO   0758
 
Citalopram  40 MG DAILY 02/16 1000 AC 02/19
 
Hydrobromide  PO   0755
 
Docusate Sodium 100 MG BID 02/16 2200 AC 02/19
 
  PO   0755
 
Levothyroxine Sodium 0.025 MG DAILY AC 02/16 0700 AC 02/19
 
  PO   0634
 
Lisinopril 10 MG DAILY 02/16 1000 AC 02/19
 
  PO   0758
 
Lorazepam 0.5 MG BID 02/18 1000 AC 02/19
 
  PO 02/25 0959  0801
 
Omeprazole 20 MG DAILY 02/15 1000 AC 02/19
 
  PO   0757
 
Ondansetron HCl 4 MG Q6P PRN 02/15 1400 AC 
 
  IV   
 
Oxycodone HCl 5 MG Q4 HRS AS NEEDED PRN 02/17 0700 DC 02/18
 
  PO   0500
 
Polyethylene Glycol 17 GM DAILY PRN 02/17 1000 AC 
 
  PO   
 
Potassium Chloride 10 MEQ DAILY 02/16 1000 AC 02/19
 
  PO   0757
 
Senna/Docusate Sodium 2 TAB AT BEDTIME AS NEED.. 02/15 1400 AC 
 
  PO   
 
Tramadol HCl 50 MG Q6P PRN 02/18 1445 AC 02/18
 
  PO   1642
 
 
 
 
Review of Systems
 
Review of Systems
Constitutional:
Denies: chills, diaphoresis, fever, malaise, weakness, unexplained weight loss. 
EENTM:
Denies: blurred vision, double vision, visual changes. 
Cardiovascular:
Denies: chest pain, edema, orthopena, palpitations, peripheral edema, syncope. 
Respiratory:
Denies: cough, hemoptysis, orthopnea, short of breath, sputum production. 
GI:
Reports: constipation.  Denies: abdominal pain, bloating, diarrhea, distention, 
nausea. 
Genitourinary:
Denies: discharge, dysuria, frequency, hematuria, nocturia. 
Musculoskeletal:
Reports: joint pain.  Denies: back pain, joint swelling, muscle pain. 
 
Past History
 
Medical History
Blood Transfusion Hx: No
Neurological: TIA
EENT: cataracts
Cardiovascular: hypertension, hyperlipidemia
Respiratory: NONE
Gastrointestinal: GERD
Hepatic: NONE
Renal: NONE
Musculoskeletal: NONE
Psychiatric: anxiety, depression
Endocrine: hypothyroidism
Blood Disorders: NONE
Cancer(s): NONE
GYN/Reproductive: miscarriage
 
Surgical History
Surgical History: knee replacement, D&C
 
Family History
Relations & Conditions If Any:
Relation not specified for:
  *No pertinent family history
 
 
Psychosocial History
Where Do You Live? Home
Services at Home: None
Smoking Status: Never Smoked
ETOH Use: occasional use
Illicit Drug Use: denies illicit drug use
 
Exam & Diagnostic Data
Last 24 Hrs of Vital Signs/I&O
 Vital Signs
 
 
Date Time Temp Pulse Resp B/P B/P Pulse O2 O2 Flow FiO2
 
     Mean Ox Delivery Rate 
 
02/19 0758  78  142/76     
 
02/19 0758  78  142/76     
 
02/19 0756  78  142/76     
 
02/19 0638 98.3 78 20 142/76  93 Room Air  
 
02/18 2203 99.2 73 20 134/60  94 Room Air  
 
02/18 1615 98.5 77 18 150/80     
 
02/18 1347 98.4 73 18 124/60  95 Room Air  
 
 
 Intake & Output
 
 
 02/19 1600 02/19 0800 02/19 0000
 
Intake Total  500 400
 
Output Total  600 550
 
Balance  -100 -150
 
    
 
Intake, Oral  500 400
 
Output, Urine  600 550
 
 
 
 
Physical Exam
General Appearance: well developed/nourished, no apparent distress, alert, awake
, comfortable
Head: atraumatic, normal appearance
Eyes:
Bilateral: PERRL, EOMI. 
Ears, Nose, Throat: normal pharynx
Neck: normal inspection, supple, full range of motion
Respiratory: normal breath sounds
Cardiovascular: regular rate/rhythm
Gastrointestinal: normal bowel sounds, soft, non-tender
Back: normal inspection, normal range of motion
Extremities: normal inspection
Neurologic/Psych: no motor/sensory deficits, awake, alert, oriented x 3, normal 
gait, normal mood/affect
Cranial Nerves: normal hearing, normal speech
Last 24 Hrs of Labs/Tim:
 Laboratory Tests
 
02/19/18 0815:
Urine Color Pending, Urine Clarity Pending, Urine pH Pending, Ur Specific 
Gravity Pending, Urine Protein Pending, Urine Ketones Pending, Urine Nitrite 
Pending, Urine Bilirubin Pending, Urine Urobilinogen Pending, Ur Leukocyte 
Esterase Pending, Ur Microscopic Pending, Urine Hemoglobin Pending, Urine 
Glucose Pending
 
02/19/18 0710:
Anion Gap 10, Estimated GFR > 60, BUN/Creatinine Ratio 18.3, CBC w Diff NO MAN 
DIFF REQ, RBC 3.02  L, MCV 90.6, MCH 31.0, MCHC 34.3, RDW 15.2  H, MPV 8.6, Gran
% 72.0, Lymphocytes % 16.7  L, Monocytes % 8.6, Eosinophils % 2.5, Basophils % 
0.2, Absolute Granulocytes 8.7  H, Absolute Lymphocytes 2.0, Absolute Monocytes 
1.0  H, Absolute Eosinophils 0.3, Absolute Basophils 0
 
02/18/18 1935:
Urine Color YEL, Urine Clarity CLEAR, Urine pH 7.0, Ur Specific Gravity 1.010, 
Urine Protein TRACE  H, Urine Ketones NEG, Urine Nitrite NEG, Urine Bilirubin 
NEG, Urine Urobilinogen 0.2, Ur Leukocyte Esterase SMALL  H, Ur Microscopic 
SEDIMENT EXAMINED, Urine RBC RARE, Urine WBC 5-10  H, Ur Epithelial Cells MANY  
H, Urine Bacteria FEW  H, Urine Mucus FEW, Urine Hemoglobin NEG, Urine Glucose 
NEG
 
02/18/18 1935:
Urine Osmolality 284  L, Ur Random Creatinine 52.7, Ur Random Sodium 46, Ur 
Random Potassium 24.1, Fraction Sodium Excret 0.4
 
 
Assessment/Plan
Assessment/Plan
This is a 82-year-old female with past medical history significant for cataracts
, TIA on statin, hypertension, hypothyroidism, anxiety, depression, GERD, 
bilateral knee osteoarthritis, status post total knee replacement right knee was
admitted to surgical service for elective left total knee replacement on 05/15/
2018. Medical team was consulted on 02/19/2018 for management of postoperative 
confusion.
 
 
Vitals were within normal limits.
Pertinent labs leukocytosis 17.7, improved to 12.1.  Hemoglobin stable.  
Platelet count was normal.  BMP normal, hyponatremia resolved.
Urinalysis showed small esterase, 5-10 WBC, few bacteria.  However denies any 
urinary symptoms.  Urine culture no growth so far.
Hip xry, knee x-ray with in normal limits showing postoperative changes.
--------------------------------------------------------------------------------
--------------
Assessment and plan
 
1.  Postoperative confusion
Patient was found to have confusion on postoperative day 1 after an elective 
left total knee replacement.  She pulled her IV line, Downey's catheter, 
restraints were ordered.  Continue safety monitor was ordered.  off note patient
received oxycodone and Ativan postoperatively.  Postoperative course was 
complicated by fall from loss of balance.  Postoperative delirium Most likely 
from opiate induced/oxycodone usage, benzodiazepine use.  Also given her sleep 
disturbances she might be delerious.  Other most common causes for post 
operative delirium were ruled out.  She was never dehydrated, no trauma, no 
infection, no fever.
* She is alert, awake, oriented 3 now.
* Continue safety monitor for now
* Avoid medications like opiates particularly oxycodone given her delirium, 
benzodiazepine use.
* Provide adequate hydration
* Avoid sleep disturbances
* Environmental modification and nonpharmacologic sleep aids.
* Orientation protocol
* Decrease use of restraints
* No respiratory symptoms, no urinary symptoms.  Infection was ruled out
* Sodium improved. No Electrolyte abnormalities.
* Monitor vitals every shift.
 
 
Thank you for consulting medical team.
Please follow attending recommendations below.
Problem List:
 1. Fall
 
 2. Unilateral primary osteoarthritis, left knee
 
 3. Status post total right knee replacement
 
 
Consult Acknowledgment
- Thank you for your consult request.
 
 
Laura Quezada MD 02/19/18 1000:
Assessment/Plan
 
Consult Acknowledgment
- Thank you for your consult request.
 
Attending MD Review Statement
 
Attending Statement
Attending MD Statement: examined this patient, discuss w/resident/PA/NP, agreed 
w/resident/PA/NP, reviewed EMR data (avail), discussed with nursing, reviewed 
images, amended to note
Attending Assessment/Plan:
82-year-old female with past medical history significant for hypertension, 
hyperlipidemia, anxiety, depression, GERD, hypothyroidism who is admitted to 
orthopedic service for an elective left knee surgery which was performed on 02/
15/2018.  Patient was doing fairly well but then developed some delirium.  
Apparently patient has been given oxycodone and she claims that she reacts back 
to oxycodone.  She has had this episodes of confusion in the past with her 
previous surgeries and she was given oxycodone.  Medicine team is consulted for 
postop delirium.  She also has some leukocytosis postoperatively.  So far her 
infection workup including checking her urinalysis and urine culture is 
negative.  Patient is almost back to her baseline terms of her confused state.  
She is a oh 3.  She remembers the details about her admission and surgery.  She
also admits that oxycodone makes her confused.  Patient has been resumed back on
her citalopram as well as Ativan per orthopedic.  She claims that her pain is 
well controlled with the current regimen which now includes Tylenol, celecoxib 
as well as tramadol. 
 
vss. 
aox3, nad. 
cv; s1,s2, rrr
resp; clear
abd; soft, nt, bs+
ext; no edema.
ms: + left knee staples. 
 
 Laboratory Tests
 
 
 02/19 02/19
 
 0815 0710
 
Chemistry  
 
  Sodium (137 - 145 mmol/L)  136  L
 
  Potassium (3.5 - 5.1 mmol/L)  4.0
 
  Chloride (98 - 107 mmol/L)  99
 
  Carbon Dioxide (22 - 30 mmol/L)  27
 
  Anion Gap (5 - 16)  10
 
  BUN (7 - 17 mg/dL)  11
 
  Creatinine (0.5 - 1.0 mg/dL)  0.6
 
  Estimated GFR (>60 ml/min)  > 60
 
  BUN/Creatinine Ratio (7 - 25 %)  18.3
 
Hematology  
 
  CBC w Diff  NO MAN DIFF REQ
 
  WBC (4.8 - 10.8 /CUMM)  12.1  H
 
  RBC (4.20 - 5.40 /CUMM)  3.02  L
 
  Hgb (12.0 - 16.0 G/DL)  9.4  L
 
  Hct (37 - 47 %)  27.3  L
 
  MCV (81.0 - 99.0 FL)  90.6
 
  MCH (27.0 - 31.0 PG)  31.0
 
  MCHC (33.0 - 37.0 G/DL)  34.3
 
  RDW (11.5 - 14.5 %)  15.2  H
 
  Plt Count (130 - 400 /CUMM)  291
 
  MPV (7.4 - 10.4 FL)  8.6
 
  Gran % (42.2 - 75.2 %)  72.0
 
  Lymphocytes % (20.5 - 51.1 %)  16.7  L
 
  Monocytes % (1.7 - 9.3 %)  8.6
 
  Eosinophils % (0 - 5 %)  2.5
 
  Basophils % (0.0 - 2.0 %)  0.2
 
  Absolute Granulocytes (1.4 - 6.5 /CUMM)  8.7  H
 
  Absolute Lymphocytes (1.2 - 3.4 /CUMM)  2.0
 
  Absolute Monocytes (0.10 - 0.60 /CUMM)  1.0  H
 
  Absolute Eosinophils (0.0 - 0.7 /CUMM)  0.3
 
  Absolute Basophils (0.0 - 0.2 /CUMM)  0
 
Urines  
 
  Urine Color (YEL,AMB,STR) YEL 
 
  Urine Clarity (CLEAR) CLEAR 
 
  Urine pH (5.0 - 8.0) 7.0 
 
  Ur Specific Gravity (1.001 - 1.035) 1.010 
 
  Urine Protein (NEG,<30 MG/DL) NEG 
 
  Urine Ketones (NEG) NEG 
 
  Urine Nitrite (NEG) NEG 
 
  Urine Bilirubin (NEG) NEG 
 
  Urine Urobilinogen (0.1  -  1.0 EU/dl) 0.2 
 
  Ur Leukocyte Esterase (NEG) NEG 
 
  Ur Microscopic EXAM NOT REQUIRED 
 
  Urine Hemoglobin (NEG) NEG 
 
  Urine Glucose (N MG/DL) NEG 
 
 
 
 
 02/18 02/18 1935 1935
 
Urines  
 
  Urine Color (YEL,AMB,STR) YEL 
 
  Urine Clarity (CLEAR) CLEAR 
 
  Urine pH (5.0 - 8.0) 7.0 
 
  Ur Specific Gravity (1.001 - 1.035) 1.010 
 
  Urine Protein (NEG,<30 MG/DL) TRACE  H 
 
  Urine Ketones (NEG) NEG 
 
  Urine Nitrite (NEG) NEG 
 
  Urine Bilirubin (NEG) NEG 
 
  Urine Urobilinogen (0.1  -  1.0 EU/dl) 0.2 
 
  Ur Leukocyte Esterase (NEG) SMALL  H 
 
  Ur Microscopic SEDIMENT EXAMINED 
 
  Urine RBC (0 - 5 /HPF) RARE 
 
  Urine WBC (0 - 2 /HPF) 5-10  H 
 
  Ur Epithelial Cells (NONE,FEW) MANY  H 
 
  Urine Bacteria (NEG/NONE) FEW  H 
 
  Urine Mucus (FEW,NONE) FEW 
 
  Urine Hemoglobin (NEG) NEG 
 
  Urine Osmolality (300 - 1000 MOSM/KG)  284  L
 
  Ur Random Creatinine (mg/dL)  52.7
 
  Ur Random Sodium (30 - 90 mmol/L)  46
 
  Ur Random Potassium (mmol/L)  24.1
 
  Fraction Sodium Excret (<1% %)  0.4
 
  Urine Glucose (N MG/DL) NEG 
 
 
Assessment and recommendations:
82-year-old female with past medical history significant for hypertension, 
hyperlipidemia, anxiety, depression, GERD, hypothyroidism who is admitted to 
orthopedic service for an elective left knee surgery which was performed on 02/
15/2018.  Medicine team is consulted for postoperative delirium.
 
Delirium seems to be resolving.  Most likely cause of delirium was narcotic use.
 Patient admits to having side effects with oxycodone including confused state. 
Urinalysis looks clean.  Patient absolutely denies any respiratory symptoms, no 
cough or shortness of breath.  Her oxygen saturations are perfect on room air.  
She has had no fevers.  She does have some leukocytosis which could be related 
to postoperative stress.  It has been stable though since yesterday.
Recommend avoid using oxycodone.  Try to control pain with other medications 
including nonnarcotic such as Tylenol.  Patient has taken tramadol in the past 
without any side effects that can also try that.  I agree with resuming her 
Ativan as well as her citalopram.  I would continue to avoid delirium triggers 
including polypharmacy.  Please make sure that on she's not constipated.  Her 
Downey has been discontinued which could also one of the delirium triggers for 
elderly patients.  Would recommend reorientation during the day and working with
physical therapy.  Encourage incentive spirometry.
DVT px; Eliquis. 
Thank you for allowing us to participate in the care of this patient, will 
follow along with you.

## 2018-02-19 NOTE — PN- ORTHOPEDIC
Subjective
Subjective:
feeling ok, no pain.  doesnt feel confused now, but admits to feeling confused 
at times during hospital stay.  RR called yesterday pm for witnessed slip and 
fall in bathroom, xrays negative.  no cp/sob/n/v, rabia diet, +voids, +bm
 
Objective
Vital Signs and I&Os
Vital Signs
 
 
Date Time Temp Pulse Resp B/P B/P Pulse O2 O2 Flow FiO2
 
     Mean Ox Delivery Rate 
 
02/19 0638 98.3 78 20 142/76  93 Room Air  
 
02/18 2203 99.2 73 20 134/60  94 Room Air  
 
02/18 1615 98.5 77 18 150/80     
 
02/18 1347 98.4 73 18 124/60  95 Room Air  
 
02/18 0840 97.8 80 20 140/78     
 
02/18 0840 97.8 80 20 140/78     
 
02/18 0840 97.8 80 20 140/78     
 
 
 Intake & Output
 
 
 02/19 0800 02/19 0000 02/18 1600 02/18 0800 02/18 0000 02/17 1600
 
Intake Total  400 800 200 250 800
 
Output Total    700
 
Balance  -150 200 -2825 250 100
 
       
 
Intake, Oral  400 800 200 250 800
 
Number      1
 
Bowel      
 
Movements      
 
Output, Urine    700
 
 
 
Physical Exam:
gen- nad, a&ox3
card- s1s2 rrr
pulm- ctab
abd- soft nt
ext- calves soft nt, palp pedal pulses, some edema lle, incision dressed- no 
erythema, no drainage. +dorsi/plantar flexion, gross sensation intact 
Current Medications:
 Current Medications
 
 
  Sig/James Start time  Last
 
Medication Dose Route Stop Time Status Admin
 
Acetaminophen 500 MG Q4-6 PRN PRN 02/18 1115 AC 02/18
 
  PO   2125
 
Acetaminophen 1,000 MG Q6P PRN 02/17 0700 DC 
 
N/A 1 UNIT IV   
 
Amlodipine Besylate 10 MG DAILY 02/15 1000 AC 02/18
 
  PO   0840
 
Apixaban 2.5 MG BID 02/16 2200 AC 02/18
 
  PO   2125
 
Atenolol 100 MG DAILY 02/16 1000 AC 02/18
 
  PO   0840
 
Atorvastatin Calcium 10 MG 1700 02/16 1700 AC 02/18
 
  PO   1642
 
Celecoxib 400 MG DAILY 02/16 1000 AC 02/18
 
  PO   0839
 
Citalopram  40 MG DAILY 02/16 1000 AC 02/18
 
Hydrobromide  PO   0839
 
Docusate Sodium 100 MG BID 02/16 2200 AC 02/18
 
  PO   2125
 
Levothyroxine Sodium 0.025 MG DAILY AC 02/16 0700 AC 02/19
 
  PO   0634
 
Lisinopril 10 MG DAILY 02/16 1000 AC 02/18
 
  PO   0840
 
Lorazepam 0.5 MG BID 02/18 1000 AC 02/18
 
  PO 02/25 0959  2125
 
Omeprazole 20 MG DAILY 02/15 1000 AC 02/18
 
  PO   0840
 
Ondansetron HCl 4 MG Q6P PRN 02/15 1400 AC 
 
  IV   
 
Oxycodone HCl 5 MG Q4 HRS AS NEEDED PRN 02/17 0700 DC 02/18
 
  PO   0500
 
Polyethylene Glycol 17 GM DAILY PRN 02/17 1000 AC 
 
  PO   
 
Potassium Chloride 10 MEQ DAILY 02/16 1000 AC 02/18
 
  PO   0839
 
Senna/Docusate Sodium 2 TAB AT BEDTIME AS NEED.. 02/15 1400 AC 
 
  PO   
 
Tramadol HCl 50 MG Q6P PRN 02/18 1445 AC 02/18
 
  PO   1642
 
 
 
 
Results
Last 48 Hours of Labs:
 Laboratory Tests
 
 
 02/19 02/18 02/18
 
 0710 1935 1935
 
Chemistry   
 
  Sodium Pending  
 
  Potassium Pending  
 
  Chloride Pending  
 
  Carbon Dioxide Pending  
 
  Anion Gap Pending  
 
  BUN Pending  
 
  Creatinine Pending  
 
  BUN/Creatinine Ratio Pending  
 
Hematology   
 
  CBC w Diff Pending  
 
  WBC Pending  
 
  RBC Pending  
 
  Hgb Pending  
 
  Hct Pending  
 
  MCV Pending  
 
  MCH Pending  
 
  MCHC Pending  
 
  RDW Pending  
 
  Plt Count Pending  
 
  MPV Pending  
 
Urines   
 
  Urine Color (YEL,AMB,STR)  YEL 
 
  Urine Clarity (CLEAR)  CLEAR 
 
  Urine pH (5.0 - 8.0)  7.0 
 
  Ur Specific Gravity (1.001 - 1.035)  1.010 
 
  Urine Protein (NEG,<30 MG/DL)  TRACE  H 
 
  Urine Ketones (NEG)  NEG 
 
  Urine Nitrite (NEG)  NEG 
 
  Urine Bilirubin (NEG)  NEG 
 
  Urine Urobilinogen (0.1  -  1.0 EU/dl)  0.2 
 
  Ur Leukocyte Esterase (NEG)  SMALL  H 
 
  Ur Microscopic  SEDIMENT EXAMINED 
 
  Urine RBC (0 - 5 /HPF)  RARE 
 
  Urine WBC (0 - 2 /HPF)  5-10  H 
 
  Ur Epithelial Cells (NONE,FEW)  MANY  H 
 
  Urine Bacteria (NEG/NONE)  FEW  H 
 
  Urine Mucus (FEW,NONE)  FEW 
 
  Urine Hemoglobin (NEG)  NEG 
 
  Urine Osmolality (300 - 1000 MOSM/KG)   284  L
 
  Ur Random Creatinine (mg/dL)   52.7
 
  Ur Random Sodium (30 - 90 mmol/L)   46
 
  Ur Random Potassium (mmol/L)   24.1
 
  Fraction Sodium Excret (<1% %)   0.4
 
  Urine Glucose (N MG/DL)  NEG 
 
 
 
 
 02/18
 
 0855
 
Chemistry 
 
  Sodium (137 - 145 mmol/L) 134  L
 
  Potassium (3.5 - 5.1 mmol/L) 3.7
 
  Chloride (98 - 107 mmol/L) 96  L
 
  Carbon Dioxide (22 - 30 mmol/L) 24
 
  Anion Gap (5 - 16) 13
 
  BUN (7 - 17 mg/dL) 10
 
  Creatinine (0.5 - 1.0 mg/dL) 0.6
 
  Estimated GFR (>60 ml/min) > 60
 
  BUN/Creatinine Ratio (7 - 25 %) 16.7
 
  Glucose (65 - 99 mg/dL) 143  H
 
  Phosphorus (2.5 - 4.5 mg/dL) 3.3
 
  Magnesium (1.6 - 2.3 mg/dL) 1.8
 
  TSH (0.270 - 4.200 uIU/mL) 5.310  H
 
Hematology 
 
  CBC w Diff NO MAN DIFF REQ
 
  WBC (4.8 - 10.8 /CUMM) 12.4  H
 
  RBC (4.20 - 5.40 /CUMM) 3.09  L
 
  Hgb (12.0 - 16.0 G/DL) 9.5  L
 
  Hct (37 - 47 %) 28.0  L
 
  MCV (81.0 - 99.0 FL) 90.8
 
  MCH (27.0 - 31.0 PG) 30.7
 
  MCHC (33.0 - 37.0 G/DL) 33.8
 
  RDW (11.5 - 14.5 %) 15.6  H
 
  Plt Count (130 - 400 /CUMM) 252
 
  MPV (7.4 - 10.4 FL) 8.6
 
  Gran % (42.2 - 75.2 %) 73.3
 
  Lymphocytes % (20.5 - 51.1 %) 18.0  L
 
  Monocytes % (1.7 - 9.3 %) 7.3
 
  Eosinophils % (0 - 5 %) 1.2
 
  Basophils % (0.0 - 2.0 %) 0.2
 
  Absolute Granulocytes (1.4 - 6.5 /CUMM) 9.1  H
 
  Absolute Lymphocytes (1.2 - 3.4 /CUMM) 2.2
 
  Absolute Monocytes (0.10 - 0.60 /CUMM) 0.9  H
 
  Absolute Eosinophils (0.0 - 0.7 /CUMM) 0.1
 
  Absolute Basophils (0.0 - 0.2 /CUMM) 0
 
 
 
 
Assessment/Plan
Assessment/Plan
A- 82yoF POD4 sp L TKR, with postop delirium though A&Ox3 now, orthopedically 
stable for STR transfer.
 
P-
nonnarcotic pain meds
pt, wbat
dc sitter
recheck ua/cx as previous ua contaminated
reg diet as tolerated
will involve medical team for input re: necessary workup for confusion prior to 
STR transfer
will dw attending
 
 
Core Measures
 
Venous Thromboembolism
VTE Risk Factors Surgery
No Mechanical VTE Prophylaxis d/t N/A MechProphylax Ordered
No VTE Pharm Prophylaxis d/t NA PharmProphylax ordered

## 2018-02-20 VITALS — SYSTOLIC BLOOD PRESSURE: 122 MMHG | DIASTOLIC BLOOD PRESSURE: 76 MMHG

## 2018-02-20 VITALS — DIASTOLIC BLOOD PRESSURE: 76 MMHG | SYSTOLIC BLOOD PRESSURE: 122 MMHG

## 2018-02-20 NOTE — PN- MEDICINE CONSULT
Jadon Ellis 02/20/18 0812:
Assessment/PlanMedical Consult
 
Assessment/Plan
Assessment:
This is a 82-year-old female with past medical history significant for cataracts
, TIA on statin, hypertension, hypothyroidism, anxiety, depression, GERD, 
bilateral knee osteoarthritis, status post total knee replacement right knee was
admitted to surgical service for elective left total knee replacement on 05/15/
2018. Medical team was consulted on 02/19/2018 for management of postoperative 
confusion.
 
 
Vitals were within normal limits.
Pertinent labs leukocytosis 17.7, improved to 12.1.  Hemoglobin stable.  
Platelet count was normal.  BMP normal, hyponatremia resolved.
Urinalysis showed small esterase, 5-10 WBC, few bacteria.  However denies any 
urinary symptoms.  Urine culture no growth so far.
Hip xry, knee x-ray with in normal limits showing postoperative changes.
--------------------------------------------------------------------------------
--------------
Assessment and plan
 
1.  Postoperative delirium
Patient was found to have confusion on postoperative day 1 after an elective 
left total knee replacement.  She has pulled her IV line, Downey's catheter, 
restraints were ordered. safety monitor was ordered.  off note patient received 
oxycodone and Ativan postoperatively.  Postoperative course was complicated by 
fall from loss of balance.  Postoperative delirium Most likely from opiate 
induced/oxycodone usage, benzodiazepine use.  Also given her sleep disturbances 
she might be delerious.  Other most common causes for post operative delirium 
were ruled out.  She was never dehydrated, no trauma, no infection, no fever.
 
* She is alert, awake, oriented 3 today.  Delirium seems to be resolved.
* Avoid medications like opiates particularly oxycodone given her delirium, 
benzodiazepine use.
* Provide adequate hydration
* Avoid sleep disturbances
* Environmental modification and nonpharmacologic sleep aids.
* Orientation protocol
* Decrease use of restraints
* No source of infection was found.  Urine analysis looks clean.  She denies 
respiratory symptoms.  No fever.  Leukocytosis resolved.
* Sodium improved. No Electrolyte abnormalities.
* Continue Tylenol, tramadol for pain management.
* Avoid constipation.
* Encourage incentive spirometry
 
 
DVT prophylaxis- Eliqus.
Thank you for consulting medical team.
Please follow attending recommendations below.
Plan:
as
Problem List:
 1. Status post total right knee replacement
 
 
Subjective
Subjective:
Patient was seen and examined this morning.  She is alert awake and oriented 3 
this morning.  No overnight events.
Patient reports good sleep overnight.  Safety monitoring was discontinued.  No 
complaints.  She is tolerating Tylenol with good pain control.
 
Review of Systems
Constitutional:
Reports: see HPI. 
 
Objective
Last 24 Hrs of Vital Signs/I&O
 Vital Signs
 
 
Date Time Temp Pulse Resp B/P B/P Pulse O2 O2 Flow FiO2
 
     Mean Ox Delivery Rate 
 
02/20 0850  73  122/76     
 
02/20 0850  73  122/76     
 
02/20 0850  73  122/76     
 
02/20 0620 98.4 73 18 122/76  94 Room Air  
 
02/20 0000      96 Room Air  
 
02/19 2245 98.3 72 20 132/70  96 Room Air  
 
02/19 1508 98.8 71 20 122/72  93   
 
 
 Intake & Output
 
 
 02/20 1600 02/20 0800 02/20 0000
 
Intake Total  100 400
 
Output Total   
 
Balance  100 400
 
    
 
Intake, IV  0 
 
Intake, Oral  100 400
 
Number  0 
 
Bowel   
 
Movements   
 
 
 
 
Physical Exam
General Appearance: well developed/nourished, no apparent distress, alert, awake
, comfortable
Head: atraumatic, normal appearance
Neck: normal inspection
Cardiovascular: regular rate/rhythm
Respiratory: normal breath sounds, chest non-tender
Abdomen: normal bowel sounds, soft, non-tender
Current Medications:
 Current Medications
 
 
  Sig/James Start time  Last
 
Medication Dose Route Stop Time Status Admin
 
Acetaminophen 500 MG Q4-6 PRN PRN 02/18 1115 AC 02/20
 
  PO   0200
 
Amlodipine Besylate 10 MG DAILY 02/15 1000 AC 02/20
 
  PO   0850
 
Apixaban 2.5 MG BID 02/16 2200 AC 02/20
 
  PO   0850
 
Atenolol 100 MG DAILY 02/16 1000 AC 02/20
 
  PO   0850
 
Atorvastatin Calcium 10 MG 1700 02/16 1700 AC 02/19
 
  PO   1739
 
Celecoxib 400 MG DAILY 02/16 1000 AC 02/20
 
  PO   0849
 
Citalopram  40 MG DAILY 02/16 1000 AC 02/20
 
Hydrobromide  PO   0850
 
Docusate Sodium 100 MG BID 02/16 2200 AC 02/20
 
  PO   0850
 
Levothyroxine Sodium 0.025 MG DAILY AC 02/16 0700 AC 02/20
 
  PO   0656
 
Lisinopril 10 MG DAILY 02/16 1000 AC 02/20
 
  PO   0850
 
Lorazepam 0.5 MG BID 02/18 1000 AC 02/20
 
  PO 02/25 0959  0849
 
Omeprazole 20 MG DAILY 02/15 1000 AC 02/20
 
  PO   0656
 
Ondansetron HCl 4 MG Q6P PRN 02/15 1400 AC 
 
  IV   
 
Polyethylene Glycol 17 GM DAILY PRN 02/17 1000 AC 
 
  PO   
 
Potassium Chloride 10 MEQ DAILY 02/16 1000 AC 02/20
 
  PO   0850
 
Senna/Docusate Sodium 2 TAB AT BEDTIME AS NEED.. 02/15 1400 AC 
 
  PO   
 
Tramadol HCl 50 MG Q6P PRN 02/18 1445 AC 02/18
 
  PO   1642
 
 
 
 
Results
Last 24 Hrs Lab/Tim Results:
 Laboratory Tests
 
 
 02/19 02/19
 
 0815 0710
 
Chemistry  
 
  Sodium (137 - 145 mmol/L)  136  L
 
  Potassium (3.5 - 5.1 mmol/L)  4.0
 
  Chloride (98 - 107 mmol/L)  99
 
  Carbon Dioxide (22 - 30 mmol/L)  27
 
  Anion Gap (5 - 16)  10
 
  BUN (7 - 17 mg/dL)  11
 
  Creatinine (0.5 - 1.0 mg/dL)  0.6
 
  Estimated GFR (>60 ml/min)  > 60
 
  BUN/Creatinine Ratio (7 - 25 %)  18.3
 
Hematology  
 
  CBC w Diff  NO MAN DIFF REQ
 
  WBC (4.8 - 10.8 /CUMM)  12.1  H
 
  RBC (4.20 - 5.40 /CUMM)  3.02  L
 
  Hgb (12.0 - 16.0 G/DL)  9.4  L
 
  Hct (37 - 47 %)  27.3  L
 
  MCV (81.0 - 99.0 FL)  90.6
 
  MCH (27.0 - 31.0 PG)  31.0
 
  MCHC (33.0 - 37.0 G/DL)  34.3
 
  RDW (11.5 - 14.5 %)  15.2  H
 
  Plt Count (130 - 400 /CUMM)  291
 
  MPV (7.4 - 10.4 FL)  8.6
 
  Gran % (42.2 - 75.2 %)  72.0
 
  Lymphocytes % (20.5 - 51.1 %)  16.7  L
 
  Monocytes % (1.7 - 9.3 %)  8.6
 
  Eosinophils % (0 - 5 %)  2.5
 
  Basophils % (0.0 - 2.0 %)  0.2
 
  Absolute Granulocytes (1.4 - 6.5 /CUMM)  8.7  H
 
  Absolute Lymphocytes (1.2 - 3.4 /CUMM)  2.0
 
  Absolute Monocytes (0.10 - 0.60 /CUMM)  1.0  H
 
  Absolute Eosinophils (0.0 - 0.7 /CUMM)  0.3
 
  Absolute Basophils (0.0 - 0.2 /CUMM)  0
 
Urines  
 
  Urine Color (YEL,AMB,STR) YEL 
 
  Urine Clarity (CLEAR) CLEAR 
 
  Urine pH (5.0 - 8.0) 7.0 
 
  Ur Specific Gravity (1.001 - 1.035) 1.010 
 
  Urine Protein (NEG,<30 MG/DL) NEG 
 
  Urine Ketones (NEG) NEG 
 
  Urine Nitrite (NEG) NEG 
 
  Urine Bilirubin (NEG) NEG 
 
  Urine Urobilinogen (0.1  -  1.0 EU/dl) 0.2 
 
  Ur Leukocyte Esterase (NEG) NEG 
 
  Ur Microscopic EXAM NOT REQUIRED 
 
  Urine Hemoglobin (NEG) NEG 
 
  Urine Glucose (N MG/DL) NEG 
 
 
 
 
 02/18 02/18
 
 1935 1935
 
Urines  
 
  Urine Color (YEL,AMB,STR) YEL 
 
  Urine Clarity (CLEAR) CLEAR 
 
  Urine pH (5.0 - 8.0) 7.0 
 
  Ur Specific Gravity (1.001 - 1.035) 1.010 
 
  Urine Protein (NEG,<30 MG/DL) TRACE  H 
 
  Urine Ketones (NEG) NEG 
 
  Urine Nitrite (NEG) NEG 
 
  Urine Bilirubin (NEG) NEG 
 
  Urine Urobilinogen (0.1  -  1.0 EU/dl) 0.2 
 
  Ur Leukocyte Esterase (NEG) SMALL  H 
 
  Ur Microscopic SEDIMENT EXAMINED 
 
  Urine RBC (0 - 5 /HPF) RARE 
 
  Urine WBC (0 - 2 /HPF) 5-10  H 
 
  Ur Epithelial Cells (NONE,FEW) MANY  H 
 
  Urine Bacteria (NEG/NONE) FEW  H 
 
  Urine Mucus (FEW,NONE) FEW 
 
  Urine Hemoglobin (NEG) NEG 
 
  Urine Osmolality (300 - 1000 MOSM/KG)  284  L
 
  Ur Random Creatinine (mg/dL)  52.7
 
  Ur Random Sodium (30 - 90 mmol/L)  46
 
  Ur Random Potassium (mmol/L)  24.1
 
  Fraction Sodium Excret (<1% %)  0.4
 
  Urine Glucose (N MG/DL) NEG 
 
 
 
 
 
Damien HERNANDEZ,Laura 02/20/18 1234:
Attending MD Review Statement
 
Attending Sign Off
Attending Cosign Statement:
I have: examined this patient, reviewed avalbl EMR data, personally reviewd 
images, discussd w/resident/PA/NP, discussed mgmt plan w/brennan, discussed mgmt 
plan w/pt, agreed w/resident/PA/NP, amended to note. 
Other Findings:
Patient seen and examined, overall doing much better.  Her mental state is back 
to her baseline.  Vital signs are stable.  Her pain is well-managed.  Medical 
issues are stable.  Postoperative care per surgery.  Continue all current 
management.  We'll sign off, please call with questions.

## 2018-02-20 NOTE — PN- ORTHOPEDIC
Subjective
Subjective:
Patient feels well. Pain is controlled with Ultram and Tylenol. Confused has 
resolved off narcotics. Denies c/p, sob, dyspnea. Tolerating a diet, w/o n/v. 
Eager to go to STR today
 
Objective
Vital Signs and I&Os
Vital Signs
 
 
Date Time Temp Pulse Resp B/P B/P Pulse O2 O2 Flow FiO2
 
     Mean Ox Delivery Rate 
 
02/20 0850  73  122/76     
 
02/20 0850  73  122/76     
 
02/20 0850  73  122/76     
 
02/20 0620 98.4 73 18 122/76  94 Room Air  
 
02/20 0000      96 Room Air  
 
02/19 2245 98.3 72 20 132/70  96 Room Air  
 
02/19 1508 98.8 71 20 122/72  93   
 
 
 Intake & Output
 
 
 02/20 1600 02/20 0800 02/20 0000 02/19 1600 02/19 0800 02/19 0000
 
Intake Total  100 400 360 500 400
 
Output Total    600 600 550
 
Balance  100 400 -240 -100 -150
 
       
 
Intake, IV  0    
 
Intake, Oral  100 400 360 500 400
 
Number  0  1  
 
Bowel      
 
Movements      
 
Output, Urine    600 600 550
 
 
 
Physical Exam:
Gen - awake an alert in a chair in NAD
Cardiac - S1S2 noted, RRR
Lungs - CTAB
Abd - soft, nontender
Ext - Left knee dressing c/d/i, mild edema, incision healing well with no signs 
of infection, redressed with abd pad, compartment softs, motor and senosry 
intact, alps in place, no significant edema or calf tenderness B/L
Current Medications:
 Current Medications
 
 
  Sig/James Start time  Last
 
Medication Dose Route Stop Time Status Admin
 
Acetaminophen 500 MG Q4-6 PRN PRN 02/18 1115 AC 02/20
 
  PO   0200
 
Amlodipine Besylate 10 MG DAILY 02/15 1000 AC 02/20
 
  PO   0850
 
Apixaban 2.5 MG BID 02/16 2200 AC 02/20
 
  PO   0850
 
Atenolol 100 MG DAILY 02/16 1000 AC 02/20
 
  PO   0850
 
Atorvastatin Calcium 10 MG 1700 02/16 1700 AC 02/19
 
  PO   1739
 
Celecoxib 400 MG DAILY 02/16 1000 AC 02/20
 
  PO   0849
 
Citalopram  40 MG DAILY 02/16 1000 AC 02/20
 
Hydrobromide  PO   0850
 
Docusate Sodium 100 MG BID 02/16 2200 AC 02/20
 
  PO   0850
 
Levothyroxine Sodium 0.025 MG DAILY AC 02/16 0700 AC 02/20
 
  PO   0656
 
Lisinopril 10 MG DAILY 02/16 1000 AC 02/20
 
  PO   0850
 
Lorazepam 0.5 MG BID 02/18 1000 AC 02/20
 
  PO 02/25 0959  0849
 
Omeprazole 20 MG DAILY 02/15 1000 AC 02/20
 
  PO   0656
 
Ondansetron HCl 4 MG Q6P PRN 02/15 1400 AC 
 
  IV   
 
Polyethylene Glycol 17 GM DAILY PRN 02/17 1000 AC 
 
  PO   
 
Potassium Chloride 10 MEQ DAILY 02/16 1000 AC 02/20
 
  PO   0850
 
Senna/Docusate Sodium 2 TAB AT BEDTIME AS NEED.. 02/15 1400 AC 
 
  PO   
 
Tramadol HCl 50 MG Q6P PRN 02/18 1445 AC 02/18
 
  PO   1642
 
 
 
 
Assessment/Plan
Assessment/Plan
82 F POD 5 s/p L TKR, with resolved postop delirium secondary to narcotics. 
Stable for STR transfer.
 
Cont nonnarcotic pain meds
OOB w/ PT, WBAT 
Reg diet as tolerated
Eliquis bid
D/c to STR today
Will d/w attending
 
 
Core Measures
 
Venous Thromboembolism
VTE Risk Factors Surgery
No Mechanical VTE Prophylaxis d/t N/A MechProphylax Ordered
No VTE Pharm Prophylaxis d/t NA PharmProphylax ordered